# Patient Record
Sex: FEMALE | Race: WHITE | Employment: FULL TIME | URBAN - METROPOLITAN AREA
[De-identification: names, ages, dates, MRNs, and addresses within clinical notes are randomized per-mention and may not be internally consistent; named-entity substitution may affect disease eponyms.]

---

## 2017-09-14 NOTE — TELEPHONE ENCOUNTER
Requested Prescriptions     Pending Prescriptions Disp Refills    ERICK CARLOS RX 2.2-25-1 mg tab [Pharmacy Med Name: TL CARLOS RX TABLETS] 90 Tab 0     Sig: TAKE 1 TABLET BY MOUTH DAILY       Last Refill: 4/3/17  Last visit:10/20/16

## 2017-09-29 ENCOUNTER — APPOINTMENT (OUTPATIENT)
Dept: MAMMOGRAPHY | Age: 42
End: 2017-09-29

## 2017-09-29 ENCOUNTER — OFFICE VISIT (OUTPATIENT)
Dept: OBGYN CLINIC | Age: 42
End: 2017-09-29

## 2017-09-29 VITALS
HEIGHT: 66 IN | SYSTOLIC BLOOD PRESSURE: 102 MMHG | WEIGHT: 180 LBS | BODY MASS INDEX: 28.93 KG/M2 | DIASTOLIC BLOOD PRESSURE: 60 MMHG

## 2017-09-29 DIAGNOSIS — Z01.411 ENCOUNTER FOR GYNECOLOGICAL EXAMINATION (GENERAL) (ROUTINE) WITH ABNORMAL FINDINGS: Primary | ICD-10-CM

## 2017-09-29 DIAGNOSIS — Z12.31 ENCOUNTER FOR SCREENING MAMMOGRAM FOR MALIGNANT NEOPLASM OF BREAST: ICD-10-CM

## 2017-09-29 DIAGNOSIS — N92.4 EXCESSIVE BLEEDING IN PREMENOPAUSAL PERIOD: ICD-10-CM

## 2017-09-29 NOTE — MR AVS SNAPSHOT
Visit Information Date & Time Provider Department Dept. Phone Encounter #  
 9/29/2017  8:20 AM Yolanda Charlton MD [facility] [phone] [id number] Upcoming Health Maintenance Date Due INFLUENZA AGE 9 TO ADULT 8/1/2017 PAP AKA CERVICAL CYTOLOGY 9/23/2020 Allergies as of 9/29/2017  Review Complete On: 9/29/2017 By: Aqiules Sparks LPN Severity Noted Reaction Type Reactions Aamir  08/19/2014    Hives Penicillins  08/18/2014    Unknown (comments) Current Immunizations  Never Reviewed No immunizations on file. Not reviewed this visit Vitals BP Height(growth percentile) Weight(growth percentile) LMP BMI OB Status 102/60 5' 5.5\" (1.664 m) 180 lb (81.6 kg) 09/10/2017 29.5 kg/m2 Having regular periods Smoking Status Never Smoker BMI and BSA Data Body Mass Index Body Surface Area  
 29.5 kg/m 2 1.94 m 2 Preferred Pharmacy Pharmacy Name Phone [facility] [address]  [facility] [phone] Your Updated Medication List  
  
   
This list is accurate as of: 9/29/17  8:40 AM.  Always use your most recent med list.  
  
  
  
  
 aspirin delayed-release 81 mg tablet Take  by mouth daily. CALCIUM 500 PO Take  by mouth. FISH OIL PO Take  by mouth. * FOLGARD 0.8- mg-mg-mcg Tab Generic drug:  folic acid-vit Y4-VPJ D65 Take 1 Tab by mouth daily. * TL CARLOS RX 2.2-25-1 mg Tab Generic drug:  folic acid-vit N3-XKG L55 TAKE 1 TABLET BY MOUTH DAILY  
  
 multivitamin tablet Commonly known as:  ONE A DAY Take 1 Tab by mouth daily. VITAMIN E PO Take  by mouth. ZOLOFT PO Take  by mouth. * Notice: This list has 2 medication(s) that are the same as other medications prescribed for you.  Read the directions carefully, and ask your doctor or other care provider to review them with you. Patient Instructions Well Visit, Ages 25 to 48: Care Instructions Your Care Instructions Physical exams can help you stay healthy. Your doctor has checked your overall health and may have suggested ways to take good care of yourself. He or she also may have recommended tests. At home, you can help prevent illness with healthy eating, regular exercise, and other steps. Follow-up care is a key part of your treatment and safety. Be sure to make and go to all appointments, and call your doctor if you are having problems. It's also a good idea to know your test results and keep a list of the medicines you take. How can you care for yourself at home? · Reach and stay at a healthy weight. This will lower your risk for many problems, such as obesity, diabetes, heart disease, and high blood pressure. · Get at least 30 minutes of physical activity on most days of the week. Walking is a good choice. You also may want to do other activities, such as running, swimming, cycling, or playing tennis or team sports. Discuss any changes in your exercise program with your doctor. · Do not smoke or allow others to smoke around you. If you need help quitting, talk to your doctor about stop-smoking programs and medicines. These can increase your chances of quitting for good. · Talk to your doctor about whether you have any risk factors for sexually transmitted infections (STIs). Having one sex partner (who does not have STIs and does not have sex with anyone else) is a good way to avoid these infections. · Use birth control if you do not want to have children at this time. Talk with your doctor about the choices available and what might be best for you. · Protect your skin from too much sun.  When you're outdoors from 10 a.m. to 4 p.m., stay in the shade or cover up with clothing and a hat with a wide brim. Wear sunglasses that block UV rays. Even when it's cloudy, put broad-spectrum sunscreen (SPF 30 or higher) on any exposed skin. · See a dentist one or two times a year for checkups and to have your teeth cleaned. · Wear a seat belt in the car. · Drink alcohol in moderation, if at all. That means no more than 2 drinks a day for men and 1 drink a day for women. Follow your doctor's advice about when to have certain tests. These tests can spot problems early. For everyone · Cholesterol. Have the fat (cholesterol) in your blood tested after age 21. Your doctor will tell you how often to have this done based on your age, family history, or other things that can increase your risk for heart disease. · Blood pressure. Have your blood pressure checked during a routine doctor visit. Your doctor will tell you how often to check your blood pressure based on your age, your blood pressure results, and other factors. · Vision. Talk with your doctor about how often to have a glaucoma test. 
· Diabetes. Ask your doctor whether you should have tests for diabetes. · Colon cancer. Have a test for colon cancer at age 48. You may have one of several tests. If you are younger than 48, you may need a test earlier if you have any risk factors. Risk factors include whether you already had a precancerous polyp removed from your colon or whether your parent, brother, sister, or child has had colon cancer. For women · Breast exam and mammogram. Talk to your doctor about when you should have a clinical breast exam and a mammogram. Medical experts differ on whether and how often women under 50 should have these tests. Your doctor can help you decide what is right for you. · Pap test and pelvic exam. Begin Pap tests at age 24. A Pap test is the best way to find cervical cancer.  The test often is part of a pelvic exam. Ask how often to have this test. 
 · Tests for sexually transmitted infections (STIs). Ask whether you should have tests for STIs. You may be at risk if you have sex with more than one person, especially if your partners do not wear condoms. For men · Tests for sexually transmitted infections (STIs). Ask whether you should have tests for STIs. You may be at risk if you have sex with more than one person, especially if you do not wear a condom. · Testicular cancer exam. Ask your doctor whether you should check your testicles regularly. · Prostate exam. Talk to your doctor about whether you should have a blood test (called a PSA test) for prostate cancer. Experts differ on whether and when men should have this test. Some experts suggest it if you are older than 39 and are -American or have a father or brother who got prostate cancer when he was younger than 72. When should you call for help? Watch closely for changes in your health, and be sure to contact your doctor if you have any problems or symptoms that concern you. Where can you learn more? Go to http://mariusz-sim.info/. Enter P072 in the search box to learn more about \"Well Visit, Ages 25 to 48: Care Instructions. \" Current as of: July 19, 2016 Content Version: 11.3 © 8483-4304 [employer], [employer]. Care instructions adapted under license by [employer] (which disclaims liability or warranty for this information). If you have questions about a medical condition or this instruction, always ask your healthcare professional. [place] [address] any warranty or liability for your use of this information. Introducing [facility]! Emma Andersen introduces [facility] patient portal. Now you can access parts of your medical record, email your doctor's office, and request medication refills online. 1. In your internet browser, go to [email or web address] 2. Click on the First Time User? Click Here link in the Sign In box. You will see the New Member Sign Up page. 3. Enter your iPAYst Access Code exactly as it appears below. You will not need to use this code after youve completed the sign-up process. If you do not sign up before the expiration date, you must request a new code. · iPAY[employer] Access Code: [email or web address] Expires: 12/28/2017  8:40 AM 
 
4. Enter the last four digits of your Social Security Number (xxxx) and Date of Birth (mm/dd/yyyy) as indicated and click Submit. You will be taken to the next sign-up page. 5. Create a iPAYst ID. This will be your iPAYst login ID and cannot be changed, so think of one that is secure and easy to remember. 6. Create a i[employer]st password. You can change your password at any time. 7. Enter your Password Reset Question and Answer. This can be used at a later time if you forget your password. 8. Enter your e-mail address. You will receive e-mail notification when new information is available in [address] 9. Click Sign Up. You can now view and download portions of your medical record. 10. Click the Download Summary menu link to download a portable copy of your medical information. If you have questions, please visit the Frequently Asked Questions section of the i[employer]st website. Remember, iPAYst is NOT to be used for urgent needs. For medical emergencies, dial 911. Now available from your iPhone and Android! Please provide this summary of care documentation to your next provider. If you have any questions after today's visit, please call [phone].

## 2017-09-29 NOTE — PROGRESS NOTES
[facility]  [email or web address]  [phone]    Whit Monterroso MD, [address] [facility]       Annual Gynecologic Exam  WWE 40-60  Chief Complaint   Patient presents with    Well Woman    Other     heavy menses       Josefa Bello is a 42 y.o.  [facility]  female who presents for an annual exam.  Patient's last menstrual period was 09/10/2017. Anup Blake Heavy especially day 1, even with 600mg ibuprofen. Soils clothes and interrupts work/meetings. Sometimes has to work from home it is so heavy. She does not report additional concerns today. Menstrual status:  Her periods are heavy. She does not report dysmenorrhea/painful menses. She does not report irregular bleeding. Sexual history and Contraception:  History   Sexual Activity    Sexual activity: Yes    Partners: Male    Birth control/ protection: Surgical     Comment: Vasectomy     She never uses condoms with sexual activity. She does not reports new sexual partner(s) in the last year. The patient does not request STD testing. Preventive Medicine History:  Her last annual GYN exam was about two years ago. Her most recent Pap smear result: normal was obtained in 2015. Her most recent HR HPV screen was Negative obtained 2 year(s) ago. She does not have a history of DEDE 2, 3 or cervical cancer. Breast health:  Last mammogram: approximate date 9/23/15 and was normal.   A mammogram was scheduled for today. Breast cancer family updated: see . Bone health: Anup Blake She does not have a history of osteopenia/osteoporosis. Osteoporosis family history updated: see .      Past Medical History:   Diagnosis Date    Abnormal Pap smear     HPV and colpo wnl fu    Anxiety     Fibrocystic breast     History of mammogram 9/23/15    Negative    Lipoma     forehead and shoulder removed in     MTHFR mutation ([facility] [address]     Pap smear for cervical cancer screening 3/24/10; 9/23/15    neg hpv neg; Negative, HPV negative     OB History    Para Term  AB Living   4 2 2 0 2 2   SAB TAB Ectopic Molar Multiple Live Births   2 0 0  0       # Outcome Date GA Lbr Ruiz/2nd Weight Sex Delivery Anes PTL Lv   4 SAB            3 SAB            2 Term            1 Term                   Past Surgical History:   Procedure Laterality Date    HX BREAST LUMPECTOMY      HX COLPOSCOPY      HX OTHER SURGICAL      facial surgery    HX SHOULDER ARTHROSCOPY      lump removed- lipoma    HX TONSILLECTOMY       Family History   Problem Relation Age of Onset    Hypertension Father     High Cholesterol Father     Stroke Paternal Grandmother      Social History     Social History    Marital status:      Spouse name: N/A    Number of children: N/A    Years of education: N/A     Occupational History    Not on file. Social History Main Topics    Smoking status: Never Smoker    Smokeless tobacco: Not on file    Alcohol use No    Drug use: Not on file    Sexual activity: Yes     Partners: Male     Birth control/ protection: Surgical      Comment: Vasectomy     Other Topics Concern    Not on file     Social History Narrative       Allergies   Allergen Reactions    Aamir Hives    Penicillins Unknown (comments)       Current Outpatient Prescriptions   Medication Sig    multivitamin (ONE A DAY) tablet Take 1 Tab by mouth daily.  VITAMIN E ACETATE (VITAMIN E PO) Take  by mouth.  aspirin delayed-release 81 mg tablet Take  by mouth daily.  CALCIUM CARBONATE (CALCIUM 500 PO) Take  by mouth.  DOCOSAHEXANOIC ACID/EPA (FISH OIL PO) Take  by mouth.  folic acid-vit O8-LCZ Q73 (FOLGARD) 0.8- mg-mg-mcg tab Take 1 Tab by mouth daily.  SERTRALINE HCL (ZOLOFT PO) Take  by mouth.  TL CARLOS RX 2.2-25-1 mg tab TAKE 1 TABLET BY MOUTH DAILY     No current facility-administered medications for this visit. There is no problem list on file for this patient.       Review of Systems - History obtained from the patient  Constitutional: negative for weight loss, fever, night sweats  HEENT: negative for hearing loss, earache, congestion, snoring, sorethroat  CV: negative for chest pain, palpitations, edema  Resp: negative for cough, shortness of breath, wheezing  GI: negative for change in bowel habits, abdominal pain, black or bloody stools  : negative for frequency, dysuria, hematuria, vaginal discharge  MSK: negative for back pain, joint pain, muscle pain  Breast: negative for breast lumps, nipple discharge, galactorrhea  Skin :negative for itching, rash, hives  Neuro: negative for dizziness, headache, confusion, weakness  Psych: negative for anxiety, depression, change in mood  Heme/lymph: negative for bleeding, bruising, pallor    Physical Exam  Visit Vitals    /60    Ht 5' 5.5\" (1.664 m)    Wt 180 lb (81.6 kg)    LMP 09/10/2017    BMI 29.5 kg/m2       Constitutional  · Appearance: well-nourished, well developed, alert, in no acute distress    HENT  · Head and Face: appears normal    Neck  · Inspection/Palpation: normal appearance, no masses or tenderness  · Lymph Nodes: no lymphadenopathy present  · Thyroid: gland size normal, nontender, no nodules or masses present on palpation    Chest  · Respiratory Effort: breathing unlabored  · Auscultation: normal breath sounds    Cardiovascular  · Heart:  · Auscultation: regular rate and rhythm without murmur    Breasts  · Inspection of Breasts: breasts symmetrical, no skin changes, no discharge present, nipple appearance normal, no skin retraction present  · Palpation of Breasts and Axillae: no masses present on palpation, no breast tenderness  · Axillary Lymph Nodes: no lymphadenopathy present    Gastrointestinal  · Abdominal Examination: abdomen non-tender to palpation, normal bowel sounds, no masses present  · Liver and spleen: no hepatomegaly present, spleen not palpable  · Hernias: no hernias identified    Genitourinary  · External Genitalia: normal appearance for age, no discharge present, no tenderness present, no inflammatory lesions present, no masses present, no atrophy present  · Vagina: normal vaginal vault without central or paravaginal defects, no discharge present, no inflammatory lesions present, no masses present  · Bladder: non-tender to palpation  · Urethra: appears normal  · Cervix: normal   · Uterus: normal size, shape and consistency  · Adnexa: no adnexal tenderness present, no adnexal masses present  · Perineum: perineum within normal limits, no evidence of trauma, no rashes or skin lesions present  · Anus: anus within normal limits, no hemorrhoids present  · Inguinal Lymph Nodes: no lymphadenopathy present    Skin  · General Inspection: no rash, no lesions identified    Neurologic/Psychiatric  · Mental Status:  · Orientation: grossly oriented to person, place and time  · Mood and Affect: mood normal, affect appropriate    Assessment:  42 y.o.  for well woman exam  Encounter Diagnoses   Name Primary?  Encounter for gynecological examination (general) (routine) with abnormal findings Yes    Excessive bleeding in premenopausal period        Plan:  The patient was counseled about diet, exercise, healthy lifestyle  We discussed current self breast exam and mammogram recommendations  We discussed current pap smear and HR HPV testing guidelines  We recommend follow up in one year for routine annual gynecologic exam or sooner if needed  We recommend follow up with a primary care physician for any chronic medical problems or non-gynecologic concerns    We discussed calcium/vitamin D/weight bearing exercise and osteoporosis prevention  Handouts were given to the patient     We discussed potential causes of symptomatic bleeding: including but not limited to hormonal, medical, infection/inflammation and structural etiologies.   We discussed options for managing symptoms including but not limited to observation, NSAIDS, hormonal management, IUDs, ablation, and hysterectomy. mirena h/o  RTC for IUD or LARC placement on menses: after patient confirms coverage with insurance and has no unprotected intercourse for two weeks. Patient advised to premedicate with 600 mg ibuprofen if she has no contraindications. Her AE and pap should also be up to date, if indicated. Disc rba of iud vs ablation, questions answered. Folllow up:  [x] return for annual well woman exam in one year or sooner if she is having problems  [] follow up and ultrasound  [x] mammogram  [] 6 months  [] 3 months  [] 1 month    Orders Placed This Encounter    CBC W/O DIFF       Results for orders placed or performed in visit on 09/29/17   [facility] MAMMO BI SCREENING INCL CAD    Narrative    STUDY: Bilateral digital screening mammogram    INDICATION:  Screening. COMPARISON:  2015, 2011    BREAST COMPOSITION:  The breasts are heterogeneously dense, which may obscure  small masses. FINDINGS: Bilateral digital screening mammography was performed and is  interpreted in conjunction with a computer assisted detection (CAD) system. No  suspicious masses or calcifications are identified. There has been no  significant change. Impression    IMPRESSION:  BI-RADS 1: Negative. No mammographic evidence of malignancy. RECOMMENDATIONS:  Next screening mammogram is recommended in one year. Consider krystal synthesis  (3-D) mammography for future screening. The patient will be notified of these results.

## 2017-09-29 NOTE — PATIENT INSTRUCTIONS

## 2017-09-30 LAB
ERYTHROCYTE [DISTWIDTH] IN BLOOD BY AUTOMATED COUNT: 13.8 % (ref 12.3–15.4)
HCT VFR BLD AUTO: 42.2 % (ref 34–46.6)
HGB BLD-MCNC: 13.8 G/DL (ref 11.1–15.9)
MCH RBC QN AUTO: 29.6 PG (ref 26.6–33)
MCHC RBC AUTO-ENTMCNC: 32.7 G/DL (ref 31.5–35.7)
MCV RBC AUTO: 91 FL (ref 79–97)
PLATELET # BLD AUTO: 375 X10E3/UL (ref 150–379)
RBC # BLD AUTO: 4.66 X10E6/UL (ref 3.77–5.28)
WBC # BLD AUTO: 9.7 X10E3/UL (ref 3.4–10.8)

## 2017-10-04 ENCOUNTER — TELEPHONE (OUTPATIENT)
Dept: OBGYN CLINIC | Age: 42
End: 2017-10-04

## 2017-10-04 NOTE — TELEPHONE ENCOUNTER
Patient notified of results and MD recommendations. Patient verbalized understanding.     ----- Message from Dominique Mas MD sent at 9/30/2017  9:16 PM EDT -----  The results are normal.   Please notify patient. Recommend f/u if still having symptoms/problems or has additional concerns.

## 2017-12-11 RX ORDER — SERTRALINE HYDROCHLORIDE 100 MG/1
TABLET, FILM COATED ORAL
Qty: 90 TAB | Refills: 0 | Status: SHIPPED | OUTPATIENT
Start: 2017-12-11 | End: 2018-03-30 | Stop reason: SDUPTHER

## 2017-12-11 NOTE — TELEPHONE ENCOUNTER
Requested Prescriptions     Pending Prescriptions Disp Refills    sertraline (ZOLOFT) 100 mg tablet [Pharmacy Med Name: SERTRALINE 100MG TABLETS] 90 Tab 0     Sig: TAKE 1 TABLET BY MOUTH DAILY       Last Refill: 10-20-16  Last visit:10-20-16

## 2017-12-13 PROBLEM — N60.19 FIBROCYSTIC BREAST: Status: ACTIVE | Noted: 2017-12-13

## 2017-12-13 PROBLEM — F41.8 ANXIETY ASSOCIATED WITH DEPRESSION: Status: ACTIVE | Noted: 2017-12-13

## 2017-12-13 PROBLEM — D68.59 PROTEIN C DEFICIENCY (HCC): Status: ACTIVE | Noted: 2017-12-13

## 2017-12-13 PROBLEM — B00.1 RECURRENT COLD SORES: Status: ACTIVE | Noted: 2017-12-13

## 2017-12-20 ENCOUNTER — OFFICE VISIT (OUTPATIENT)
Dept: INTERNAL MEDICINE CLINIC | Age: 42
End: 2017-12-20

## 2017-12-20 VITALS
TEMPERATURE: 97.9 F | HEIGHT: 66 IN | OXYGEN SATURATION: 99 % | DIASTOLIC BLOOD PRESSURE: 78 MMHG | HEART RATE: 61 BPM | SYSTOLIC BLOOD PRESSURE: 102 MMHG | BODY MASS INDEX: 29.6 KG/M2 | RESPIRATION RATE: 16 BRPM | WEIGHT: 184.2 LBS

## 2017-12-20 DIAGNOSIS — F41.8 ANXIETY ASSOCIATED WITH DEPRESSION: Primary | ICD-10-CM

## 2017-12-20 DIAGNOSIS — D68.59 PROTEIN C DEFICIENCY (HCC): ICD-10-CM

## 2017-12-20 DIAGNOSIS — B00.1 RECURRENT COLD SORES: ICD-10-CM

## 2017-12-20 DIAGNOSIS — Z15.89 MTHFR MUTATION: ICD-10-CM

## 2017-12-20 LAB
ALBUMIN SERPL-MCNC: 4.8 G/DL (ref 3.9–5.4)
ALKALINE PHOS POC: 100 U/L (ref 38–126)
ALT SERPL-CCNC: 19 U/L (ref 9–52)
AST SERPL-CCNC: 24 U/L (ref 14–36)
BUN BLD-MCNC: 18 MG/DL (ref 7–17)
CALCIUM BLD-MCNC: 10.2 MG/DL (ref 8.4–10.2)
CHLORIDE BLD-SCNC: 103 MMOL/L (ref 98–107)
CHOLEST SERPL-MCNC: 230 MG/DL (ref 0–200)
CO2 POC: 26 MMOL/L (ref 22–32)
CREAT BLD-MCNC: 0.7 MG/DL (ref 0.7–1.2)
EGFR (POC): 106.1
GLUCOSE POC: 87 MG/DL (ref 65–105)
GRAN# POC: 7.7 K/UL (ref 2–7.8)
GRAN% POC: 69.8 % (ref 37–92)
HCT VFR BLD CALC: 41.6 % (ref 37–51)
HDLC SERPL-MCNC: 84 MG/DL (ref 35–130)
HGB BLD-MCNC: 14 G/DL (ref 12–18)
LDL CHOLESTEROL POC: 120.6 MG/DL (ref 0–130)
LY# POC: 2.9 K/UL (ref 0.6–4.1)
LY% POC: 27 % (ref 10–58.5)
MCH RBC QN: 30.7 PG (ref 26–32)
MCHC RBC-ENTMCNC: 33.6 G/DL (ref 30–36)
MCV RBC: 91 FL (ref 80–97)
MID #, POC: 0.3 K/UL (ref 0–1.8)
MID% POC: 3.2 % (ref 0.1–24)
PLATELET # BLD: 349 K/UL (ref 140–440)
POTASSIUM SERPL-SCNC: 4.2 MMOL/L (ref 3.6–5)
PROT SERPL-MCNC: 7.8 G/DL (ref 6.3–8.2)
RBC # BLD: 4.55 M/UL (ref 4.2–6.3)
SODIUM SERPL-SCNC: 143 MMOL/L (ref 137–145)
TCHOL/HDL RATIO (POC): 2.7 (ref 0–4)
TOTAL BILIRUBIN POC: 0.8 MG/DL (ref 0.2–1.3)
TRIGL SERPL-MCNC: 127 MG/DL (ref 0–200)
TSH BLD-ACNC: 1.99 UIU/ML (ref 0.4–4.2)
VLDLC SERPL CALC-MCNC: 25.4 MG/DL
WBC # BLD: 10.9 K/UL (ref 4.1–10.9)

## 2017-12-20 NOTE — PROGRESS NOTES
This note will not be viewable in [address] Yulissa Hill is a 36 y.o. female and presents with Anxiety (1 yr f/u)  . Subjective:  Mrs. Jordana Andersen returns for office today in follow-up of multiple medical problems. The patient is treated for anxiety associated with depression. She is on sertraline. This works well for her. Her only side effect has been weight gain over time but she is trying to exercise and lose weight actively. She the medication has never given her any tolerance or breakthrough symptoms. The patient has recurrent cold sores and does take acyclovir periodically for reoccurrences. She has noted market improvement with the use of the Zovirax ointment. The patient has protein C deficiency. The patient has had no blood clots to date. She is not on any type of hormonal therapy. The patient also has the [facility] FR mutation and remains on full guard for this. She has not had her cholesterol level done in some time and has no history of vascular disease. The patient is up-to-date on Pap testing and mammography.     Past Medical History:   Diagnosis Date    Abnormal Pap smear 1993    HPV and colpo wnl fu    Anxiety     Anxiety associated with depression 12/13/2017    Fibrocystic breast     History of mammogram 9/23/15; 9/29/17    Negative; Negative    Lipoma     forehead and shoulder removed in 2008    MTHFR mutation ([facility] [address]     MTHFR mutation ([facility] [address] 12/20/2017    Pap smear for cervical cancer screening 3/24/10; 9/23/15    neg hpv neg; Negative, HPV negative    Protein C deficiency ([facility] [address] 12/13/2017    And a prothrombin gene mutation    Recurrent cold sores 12/13/2017     Past Surgical History:   Procedure Laterality Date    HX BREAST LUMPECTOMY  1995    HX COLPOSCOPY  1993    HX OTHER SURGICAL      facial surgery    HX SHOULDER ARTHROSCOPY  2008    lump removed- lipoma    HX TONSILLECTOMY       Allergies   Allergen Reactions    Aamir Hives    Penicillins Unknown (comments)     Current Outpatient Prescriptions   Medication Sig Dispense Refill    acyclovir (ZOVIRAX) 400 mg tablet Take 400 mg by mouth every four (4) hours (while awake).  acyclovir (ZOVIRAX) 5 % ointment Apply  to affected area every three (3) hours.  sertraline (ZOLOFT) 100 mg tablet TAKE 1 TABLET BY MOUTH DAILY 90 Tab 0    multivitamin (ONE A DAY) tablet Take 1 Tab by mouth daily.  VITAMIN E ACETATE (VITAMIN E PO) Take  by mouth.  aspirin delayed-release 81 mg tablet Take  by mouth daily.  CALCIUM CARBONATE (CALCIUM 500 PO) Take  by mouth.  DOCOSAHEXANOIC ACID/EPA (FISH OIL PO) Take  by mouth.  folic acid-vit P3-JSH G60 (FOLGARD) 0.8- mg-mg-mcg tab Take 1 Tab by mouth daily.        Social History     Social History    Marital status:      Spouse name: N/A    Number of children: N/A    Years of education: N/A     Social History Main Topics    Smoking status: Never Smoker    Smokeless tobacco: Never Used    Alcohol use No      Comment: socially    Drug use: No    Sexual activity: Yes     Partners: Male     Birth control/ protection: Surgical      Comment: Vasectomy     Other Topics Concern    None     Social History Narrative     Family History   Problem Relation Age of Onset    Hypertension Father     High Cholesterol Father     Stroke Paternal Grandmother        Health Maintenance   Topic Date Due    DTaP/Tdap/Td series (1 - Tdap) 10/25/1995    PAP AKA CERVICAL CYTOLOGY  09/23/2020    Influenza Age 5 to Adult  Addressed        Review of Systems  Constitutional: negative for fevers, chills, anorexia and weight loss  Eyes:   negative for visual disturbance and irritation  ENT:   negative for tinnitus,sore throat,nasal congestion,ear pain,hoarseness  Respiratory:  negative for cough, hemoptysis, dyspnea,wheezing  CV:   negative for chest pain, palpitations, lower extremity edema  GI:   negative for nausea, vomiting, diarrhea, abdominal pain,melena  Endo:               negative for polyuria,polydipsia,polyphagia,heat intolerance  Genitourinary: negative for frequency, dysuria and hematuria  Integumentary: negative for rash and pruritus  Hematologic:  negative for easy bruising and gum/nose bleeding  Musculoskel: negative for myalgias, arthralgias, back pain, muscle weakness, joint pain  Neurological:  negative for headaches, dizziness, vertigo, memory problems and gait   Behavl/Psych: negative for feelings of anxiety, depression, mood changes  ROS otherwise negative      Objective:  Visit Vitals    /78 (BP 1 Location: Right arm, BP Patient Position: Sitting)    Pulse 61    Temp 97.9 °F (36.6 °C) (Oral)    Resp 16    Ht 5' 5.5\" (1.664 m)    Wt 184 lb 3.2 oz (83.6 kg)    LMP 11/30/2017 (Approximate)    SpO2 99%    BMI 30.19 kg/m2     Body mass index is 30.19 kg/(m^2). Physical Exam:   General appearance - alert, well appearing, and in no distress  Mental status - alert, oriented to person, place, and time  EYE-ARACELI, EOMI,conjunctiva normal bilaterally, lids normal  ENT-ENT exam normal, no neck nodes or sinus tenderness  Nose - normal and patent, no erythema,  Or discharge   Mouth - mucous membranes moist, pharynx normal without lesions  Neck - supple, no significant adenopathy or bruit  Chest - clear to auscultation, no wheezes, rales or rhonchi. Heart - normal rate, regular rhythm, normal S1, S2, no murmurs, rubs, clicks or gallops   Abdomen - soft, nontender, nondistended, no masses or organomegaly  Lymph- no adenopathy palpable  Ext-peripheral pulses normal, no pedal edema, no clubbing or cyanosis  Skin-Warm and dry.  no hyperpigmentation, vitiligo, or suspicious lesions  Neuro -alert, oriented, normal speech, no focal findings or movement disorder noted      Assessment/Plan:  Diagnoses and all orders for this visit:    Anxiety associated with depression  -     AMB POC COMPLETE CBC,AUTOMATED ENTER  -     AMB POC COMPREHENSIVE METABOLIC PANEL  -     RI COLLECTION VENOUS BLOOD,VENIPUNCTURE  -     AMB POC TSH    Recurrent cold sores    MTHFR mutation (HCC)  -     AMB POC LIPID PROFILE    Protein C deficiency ([facility] [address]        Other instructions: The patient's medications are reviewed and reconciled. No change in her current medical regimen is made. A low-carb diet and exercise regimen have been discussed with the patient for weight loss. Await results of labs as drawn today    Follow-up yearly    Follow-up Disposition:  Return in about 1 year (around 12/20/2018). I have reviewed with the patient details of the assessment and plan and all questions were answered. Relevent patient education was performed. The most recent lab findings were reviewed with the patient. An After Visit Summary was printed and given to the patient.     Raffi Shah MD

## 2017-12-20 NOTE — PROGRESS NOTES
Chief Complaint   Patient presents with    Anxiety     1 yr f/u     1. Have you been to the ER, urgent care clinic since your last visit? Hospitalized since your last visit? No    2. Have you seen or consulted any other health care providers outside of the [address] [place] [address] since your last visit? Include any pap smears or colon screening.  No

## 2017-12-20 NOTE — MR AVS SNAPSHOT
Visit Information Date & Time Provider Department Dept. Phone Encounter #  
 12/20/2017  3:00 PM [facility] [phone] [id number] Follow-up Instructions Return in about 1 year (around 12/20/2018). Follow-up and Disposition History Your Appointments 10/1/2018  9:20 AM  
MAMMOGRAPHY with MAMMOGRAM, JERRY Amador Patrick [facility] Appt Note: mammo and ae TP  
 [address]  
  
    
 10/1/2018  9:40 AM  
ESTABLISHED PATIENT with MD Amador Ramsey ([facility] Appt Note: mammo and ae TP  
 [address] [facility] [id number]  
[facility] [address] Upcoming Health Maintenance Date Due DTaP/Tdap/Td series (1 - Tdap) 10/25/1995 PAP AKA CERVICAL CYTOLOGY 9/23/2020 Allergies as of 12/20/2017  Review Complete On: 12/20/2017 By: Deanna Sethi MD  
  
 Severity Noted Reaction Type Reactions [place]  08/19/2014    Hives Penicillins  08/18/2014    Unknown (comments) Current Immunizations  Never Reviewed No immunizations on file. Not reviewed this visit You Were Diagnosed With   
  
 Codes Comments Anxiety associated with depression    -  Primary ICD-10-CM: F41.8 ICD-9-CM: 300.4 Recurrent cold sores     ICD-10-CM: B00.1 ICD-9-CM: 054.9 MTHFR mutation ([facility] [address]     ICD-10-CM: E72.12 
ICD-9-CM: 270.4 Protein C deficiency ([facility] [address]     ICD-10-CM: F27.36 
ICD-9-CM: 289.81 Vitals BP Pulse Temp Resp Height(growth percentile) Weight(growth percentile) 102/78 (BP 1 Location: Right arm, BP Patient Position: Sitting) 61 97.9 °F (36.6 °C) (Oral) 16 5' 5.5\" (1.664 m) 184 lb 3.2 oz (83.6 kg) LMP SpO2 BMI OB Status Smoking Status 11/30/2017 (Approximate) 99% 30.19 kg/m2 Having regular periods Never Smoker BMI and BSA Data Body Mass Index Body Surface Area  
 30.19 kg/m 2 1.97 m 2 Preferred Pharmacy Pharmacy Name Phone [facility] [address]  [facility] [phone] Your Updated Medication List  
  
   
This list is accurate as of: 12/20/17  3:58 PM.  Always use your most recent med list.  
  
  
  
  
 * acyclovir 400 mg tablet Commonly known as:  ZOVIRAX Take 400 mg by mouth every four (4) hours (while awake). * acyclovir 5 % ointment Commonly known as:  ZOVIRAX Apply  to affected area every three (3) hours. aspirin delayed-release 81 mg tablet Take  by mouth daily. CALCIUM 500 PO Take  by mouth. FISH OIL PO Take  by mouth. FOLGARD 0.8- mg-mg-mcg Tab Generic drug:  folic acid-vit E4-OFB S57 Take 1 Tab by mouth daily. multivitamin tablet Commonly known as:  ONE A DAY Take 1 Tab by mouth daily. sertraline 100 mg tablet Commonly known as:  ZOLOFT  
TAKE 1 TABLET BY MOUTH DAILY  
  
 VITAMIN E PO Take  by mouth. * Notice: This list has 2 medication(s) that are the same as other medications prescribed for you. Read the directions carefully, and ask your doctor or other care provider to review them with you. We Performed the Following AMB POC COMPLETE CBC,AUTOMATED ENTER Q079425 CPT(R)] AMB POC COMPREHENSIVE METABOLIC PANEL [43536 CPT(R)] AMB POC LIPID PROFILE [17752 CPT(R)] AMB POC TSH [44143 CPT(R)] SD COLLECTION VENOUS BLOOD,VENIPUNCTURE [id number] CPT(R)] Follow-up Instructions Return in about 1 year (around 12/20/2018). Patient Instructions Cold Sores: Care Instructions Your Care Instructions Cold sores are clusters of small blisters on the lip and skin around or inside the mouth. Often the first sign of a cold sore is a spot that tingles, burns, or itches. A blister usually forms within 24 hours. The skin around the blisters can be red and inflamed. The blisters can break open, weep a clear fluid, and then scab over after a few days. Cold sores most often heal in 7 to 10 days without a scar. They are sometimes called fever blisters. Cold sores are caused by a virus called the herpes simplex virus. This virus also causes some cases of genital herpes. The virus can spread from a sore in the genital area to the lips. Or it can spread from a cold sore on the lips to the genital area. Cold sores most often go away on their own. But if they are severe, embarrass you, or cause pain, your doctor may prescribe antiviral medicine to relieve pain and help prevent outbreaks. Follow-up care is a key part of your treatment and safety. Be sure to make and go to all appointments, and call your doctor if you are having problems. It's also a good idea to know your test results and keep a list of the medicines you take. How can you care for yourself at home? · Wash your hands often. And try not to touch your cold sores. This will help to avoid spreading the virus to your eyes or genital area, or to other people. This is more likely to happen if this is your first cold sore outbreak. · Place ice or a cool, wet towel on the sores 3 times a day. Do this for 20 minutes each time. It may help to reduce redness and swelling. · If you are just getting a cold sore, try over-the-counter docosanol (Abreva) cream to reduce symptoms. · If your doctor prescribed antiviral medicine to relieve pain and help prevent outbreaks, be sure to follow the directions. · Take an over-the-counter pain medicine, such as acetaminophen (Tylenol), ibuprofen (Advil, Motrin), or naproxen (Aleve), as needed.  Read and follow all instructions on the label. · Do not take two or more pain medicines at the same time unless the doctor told you to. Many pain medicines have acetaminophen, which is Tylenol. Too much acetaminophen (Tylenol) can be harmful. · Avoid citrus fruit, tomatoes, and other foods that contain acid. · Use over-the-counter ointments to numb sore areas in the mouth or on the lips. These include Orajel and Anbesol. · Do not kiss or have oral sex with anyone while you have a cold sore. To prevent cold sores in the future · Avoid long exposure of your lips to the sun. (Wear a hat to help shade your mouth.) · Do not kiss or have oral sex with someone who has a cold sore. Do not have sex or oral sex with someone who has a genital herpes outbreak. · Using lip balm that contains sunscreen may help reduce outbreaks of cold sores. · Avoid foods that seem to cause your cold sores to come back. · Do not share towels, razors, silverware, toothbrushes, or other objects with a person who has a cold sore. When should you call for help? Call your doctor now or seek immediate medical care if: 
? · Your symptoms are painful and you want to try antiviral medicine. ? · You have signs of infection, such as: 
¨ Increased pain, swelling, warmth, or redness. ¨ Red streaks leading from a cold sore. ¨ Pus draining from a cold sore. ¨ A fever. ? · You have a cold sore and develop eye pain, eye discharge, or any changes in your vision. ? Watch closely for changes in your health, and be sure to contact your doctor if: 
? · The cold sore does not heal in 7 to 10 days. ? · You get cold sores often. Where can you learn more? Go to http://mariusz-sim.info/. Enter A995 in the search box to learn more about \"Cold Sores: Care Instructions. \" Current as of: March 20, 2017 Content Version: 11.4 © 9007-1762 [employer].  Care instructions adapted under license by [address] (which disclaims liability or warranty for this information). If you have questions about a medical condition or this instruction, always ask your healthcare professional. [address] any warranty or liability for your use of this information. Patient Instructions History Introducing [facility]! [facility] introduces GigaSpaces patient portal. Now you can access parts of your medical record, email your doctor's office, and request medication refills online. 1. In your internet browser, go to [email or web address] 2. Click on the First Time User? Click Here link in the Sign In box. You will see the New Member Sign Up page. 3. Enter your GigaSpaces Access Code exactly as it appears below. You will not need to use this code after youve completed the sign-up process. If you do not sign up before the expiration date, you must request a new code. · GigaSpaces Access Code: [email or web address] Expires: 12/28/2017  7:40 AM 
 
4. Enter the last four digits of your Social Security Number (xxxx) and Date of Birth (mm/dd/yyyy) as indicated and click Submit. You will be taken to the next sign-up page. 5. Create a GigaSpaces ID. This will be your GigaSpaces login ID and cannot be changed, so think of one that is secure and easy to remember. 6. Create a GigaSpaces password. You can change your password at any time. 7. Enter your Password Reset Question and Answer. This can be used at a later time if you forget your password. 8. Enter your e-mail address. You will receive e-mail notification when new information is available in [address] 9. Click Sign Up. You can now view and download portions of your medical record. 10. Click the Download Summary menu link to download a portable copy of your medical information.  
 
If you have questions, please visit the Frequently Asked Questions section of the [employer]. Remember, [employer] is NOT to be used for urgent needs. For medical emergencies, dial 911. Now available from your iPhone and Android! Please provide this summary of care documentation to your next provider. Your primary care clinician is listed as MADINA Chin. If you have any questions after today's visit, please call [phone].

## 2017-12-20 NOTE — PATIENT INSTRUCTIONS
Cold Sores: Care Instructions  Your Care Instructions  Cold sores are clusters of small blisters on the lip and skin around or inside the mouth. Often the first sign of a cold sore is a spot that tingles, burns, or itches. A blister usually forms within 24 hours. The skin around the blisters can be red and inflamed. The blisters can break open, weep a clear fluid, and then scab over after a few days. Cold sores most often heal in 7 to 10 days without a scar. They are sometimes called fever blisters. Cold sores are caused by a virus called the herpes simplex virus. This virus also causes some cases of genital herpes. The virus can spread from a sore in the genital area to the lips. Or it can spread from a cold sore on the lips to the genital area. Cold sores most often go away on their own. But if they are severe, embarrass you, or cause pain, your doctor may prescribe antiviral medicine to relieve pain and help prevent outbreaks. Follow-up care is a key part of your treatment and safety. Be sure to make and go to all appointments, and call your doctor if you are having problems. It's also a good idea to know your test results and keep a list of the medicines you take. How can you care for yourself at home? · Wash your hands often. And try not to touch your cold sores. This will help to avoid spreading the virus to your eyes or genital area, or to other people. This is more likely to happen if this is your first cold sore outbreak. · Place ice or a cool, wet towel on the sores 3 times a day. Do this for 20 minutes each time. It may help to reduce redness and swelling. · If you are just getting a cold sore, try over-the-counter docosanol (Abreva) cream to reduce symptoms. · If your doctor prescribed antiviral medicine to relieve pain and help prevent outbreaks, be sure to follow the directions.   · Take an over-the-counter pain medicine, such as acetaminophen (Tylenol), ibuprofen (Advil, Motrin), or naproxen (Aleve), as needed. Read and follow all instructions on the label. · Do not take two or more pain medicines at the same time unless the doctor told you to. Many pain medicines have acetaminophen, which is Tylenol. Too much acetaminophen (Tylenol) can be harmful. · Avoid citrus fruit, tomatoes, and other foods that contain acid. · Use over-the-counter ointments to numb sore areas in the mouth or on the lips. These include Orajel and Anbesol. · Do not kiss or have oral sex with anyone while you have a cold sore. To prevent cold sores in the future  · Avoid long exposure of your lips to the sun. (Wear a hat to help shade your mouth.)  · Do not kiss or have oral sex with someone who has a cold sore. Do not have sex or oral sex with someone who has a genital herpes outbreak. · Using lip balm that contains sunscreen may help reduce outbreaks of cold sores. · Avoid foods that seem to cause your cold sores to come back. · Do not share towels, razors, silverware, toothbrushes, or other objects with a person who has a cold sore. When should you call for help? Call your doctor now or seek immediate medical care if:  ? · Your symptoms are painful and you want to try antiviral medicine. ? · You have signs of infection, such as:  ¨ Increased pain, swelling, warmth, or redness. ¨ Red streaks leading from a cold sore. ¨ Pus draining from a cold sore. ¨ A fever. ? · You have a cold sore and develop eye pain, eye discharge, or any changes in your vision. ? Watch closely for changes in your health, and be sure to contact your doctor if:  ? · The cold sore does not heal in 7 to 10 days. ? · You get cold sores often. Where can you learn more? Go to http://mariusz-sim.info/. Enter L977 in the search box to learn more about \"Cold Sores: Care Instructions. \"  Current as of: March 20, 2017  Content Version: 11.4  © 9812-4538 [employer], [employer].  Care instructions adapted under license by [address] (which disclaims liability or warranty for this information). If you have questions about a medical condition or this instruction, always ask your healthcare professional. [address] any warranty or liability for your use of this information.

## 2018-03-30 RX ORDER — SERTRALINE HYDROCHLORIDE 100 MG/1
TABLET, FILM COATED ORAL
Qty: 90 TAB | Refills: 0 | Status: SHIPPED | OUTPATIENT
Start: 2018-03-30 | End: 2018-07-29 | Stop reason: SDUPTHER

## 2018-07-29 RX ORDER — SERTRALINE HYDROCHLORIDE 100 MG/1
TABLET, FILM COATED ORAL
Qty: 90 TAB | Refills: 0 | Status: SHIPPED | OUTPATIENT
Start: 2018-07-29 | End: 2018-11-28 | Stop reason: SDUPTHER

## 2018-08-05 RX ORDER — ACYCLOVIR 400 MG/1
TABLET ORAL
Qty: 25 TAB | Refills: 0 | Status: SHIPPED | OUTPATIENT
Start: 2018-08-05 | End: 2019-03-29 | Stop reason: SDUPTHER

## 2018-10-01 ENCOUNTER — APPOINTMENT (OUTPATIENT)
Dept: OBGYN CLINIC | Age: 43
End: 2018-10-01

## 2018-10-15 ENCOUNTER — OFFICE VISIT (OUTPATIENT)
Dept: OBGYN CLINIC | Age: 43
End: 2018-10-15

## 2018-10-15 VITALS
SYSTOLIC BLOOD PRESSURE: 106 MMHG | DIASTOLIC BLOOD PRESSURE: 66 MMHG | BODY MASS INDEX: 30.25 KG/M2 | HEIGHT: 66 IN | WEIGHT: 188.2 LBS

## 2018-10-15 DIAGNOSIS — Z01.419 WELL WOMAN EXAM WITH ROUTINE GYNECOLOGICAL EXAM: Primary | ICD-10-CM

## 2018-10-15 NOTE — MR AVS SNAPSHOT
[address] [facility] [address] [facility] 
[phone] Patient: Marely Rodriguez MRN: [id number] :1975 Visit Information Date & Time Provider Department Dept. Phone Encounter #  
 10/15/2018  2:00 PM Carmen Scott MD Amador Patrick [phone] [id number] Upcoming Health Maintenance Date Due Influenza Age 5 to Adult 2018 PAP AKA CERVICAL CYTOLOGY 2020 Allergies as of 10/15/2018  Review Complete On: 10/15/2018 By: Hulan Lundborg Severity Noted Reaction Type Reactions [place]  2014    Hives Penicillins  2014    Unknown (comments) Current Immunizations  Never Reviewed No immunizations on file. Not reviewed this visit Vitals BP Height(growth percentile) Weight(growth percentile) LMP BMI OB Status 106/66 5' 5.5\" (1.664 m) 188 lb 3.2 oz (85.4 kg) 2018 (Exact Date) 30.84 kg/m2 Having regular periods Smoking Status Never Smoker BMI and BSA Data Body Mass Index Body Surface Area  
 30.84 kg/m 2 1.99 m 2 Preferred Pharmacy Pharmacy Name Phone [facility] [address]  [facility] [phone] Your Updated Medication List  
  
   
This list is accurate as of 10/15/18  2:11 PM.  Always use your most recent med list.  
  
  
  
  
 * acyclovir 5 % ointment Commonly known as:  ZOVIRAX Apply  to affected area every three (3) hours. * acyclovir 400 mg tablet Commonly known as:  ZOVIRAX TAKE 1 TABLET BY MOUTH FOUR TIMES DAILY  
  
 aspirin delayed-release 81 mg tablet Take  by mouth daily. CALCIUM 500 PO Take  by mouth. FISH OIL PO Take  by mouth. * FOLGARD 0.8- mg-mg-mcg Tab Generic drug:  folic acid-vit H0-QTE E42 Take 1 Tab by mouth daily. * TL CARLOS RX 2.2-25-1 mg Tab Generic drug:  folic acid-vit T3-ESY X23 TAKE 1 TABLET BY MOUTH DAILY  
  
 multivitamin tablet Commonly known as:  ONE A DAY Take 1 Tab by mouth daily. sertraline 100 mg tablet Commonly known as:  ZOLOFT  
TAKE 1 TABLET BY MOUTH DAILY  
  
 VITAMIN E PO Take  by mouth. * Notice: This list has 4 medication(s) that are the same as other medications prescribed for you. Read the directions carefully, and ask your doctor or other care provider to review them with you. Patient Instructions Well Visit, Ages 25 to 48: Care Instructions Your Care Instructions Physical exams can help you stay healthy. Your doctor has checked your overall health and may have suggested ways to take good care of yourself. He or she also may have recommended tests. At home, you can help prevent illness with healthy eating, regular exercise, and other steps. Follow-up care is a key part of your treatment and safety. Be sure to make and go to all appointments, and call your doctor if you are having problems. It's also a good idea to know your test results and keep a list of the medicines you take. How can you care for yourself at home? · Reach and stay at a healthy weight. This will lower your risk for many problems, such as obesity, diabetes, heart disease, and high blood pressure. · Get at least 30 minutes of physical activity on most days of the week. Walking is a good choice. You also may want to do other activities, such as running, swimming, cycling, or playing tennis or team sports. Discuss any changes in your exercise program with your doctor. · Do not smoke or allow others to smoke around you. If you need help quitting, talk to your doctor about stop-smoking programs and medicines. These can increase your chances of quitting for good. · Talk to your doctor about whether you have any risk factors for sexually transmitted infections (STIs).  Having one sex partner (who does not have STIs and does not have sex with anyone else) is a good way to avoid these infections. · Use birth control if you do not want to have children at this time. Talk with your doctor about the choices available and what might be best for you. · Protect your skin from too much sun. When you're outdoors from 10 a.m. to 4 p.m., stay in the shade or cover up with clothing and a hat with a wide brim. Wear sunglasses that block UV rays. Even when it's cloudy, put broad-spectrum sunscreen (SPF 30 or higher) on any exposed skin. · See a dentist one or two times a year for checkups and to have your teeth cleaned. · Wear a seat belt in the car. · Drink alcohol in moderation, if at all. That means no more than 2 drinks a day for men and 1 drink a day for women. Follow your doctor's advice about when to have certain tests. These tests can spot problems early. For everyone · Cholesterol. Have the fat (cholesterol) in your blood tested after age 21. Your doctor will tell you how often to have this done based on your age, family history, or other things that can increase your risk for heart disease. · Blood pressure. Have your blood pressure checked during a routine doctor visit. Your doctor will tell you how often to check your blood pressure based on your age, your blood pressure results, and other factors. · Vision. Talk with your doctor about how often to have a glaucoma test. 
· Diabetes. Ask your doctor whether you should have tests for diabetes. · Colon cancer. Have a test for colon cancer at age 48. You may have one of several tests. If you are younger than 48, you may need a test earlier if you have any risk factors. Risk factors include whether you already had a precancerous polyp removed from your colon or whether your parent, brother, sister, or child has had colon cancer. For women · Breast exam and mammogram. Talk to your doctor about when you should have a clinical breast exam and a mammogram. Medical experts differ on whether and how often women under 50 should have these tests. Your doctor can help you decide what is right for you. · Pap test and pelvic exam. Begin Pap tests at age 24. A Pap test is the best way to find cervical cancer. The test often is part of a pelvic exam. Ask how often to have this test. 
· Tests for sexually transmitted infections (STIs). Ask whether you should have tests for STIs. You may be at risk if you have sex with more than one person, especially if your partners do not wear condoms. For men · Tests for sexually transmitted infections (STIs). Ask whether you should have tests for STIs. You may be at risk if you have sex with more than one person, especially if you do not wear a condom. · Testicular cancer exam. Ask your doctor whether you should check your testicles regularly. · Prostate exam. Talk to your doctor about whether you should have a blood test (called a PSA test) for prostate cancer. Experts differ on whether and when men should have this test. Some experts suggest it if you are older than 39 and are -American or have a father or brother who got prostate cancer when he was younger than 72. When should you call for help? Watch closely for changes in your health, and be sure to contact your doctor if you have any problems or symptoms that concern you. Where can you learn more? Go to http://mariusz-sim.info/. Enter P072 in the search box to learn more about \"Well Visit, Ages 25 to 48: Care Instructions. \" Current as of: March 29, 2018 Content Version: 11.8 © 7056-7620 Healthwise, Incorporated. Care instructions adapted under license by [employer] (which disclaims liability or warranty for this information).  If you have questions about a medical condition or this instruction, always ask your healthcare professional. [employer] disclaims any warranty or liability for your use of this information. Introducing [facility]! [employer] introduces Skycheckin patient portal. Now you can access parts of your medical record, email your doctor's office, and request medication refills online. 1. In your internet browser, go to [email or web address] Mother [email or web address] uma information [email or web address] Mother List 2. Click on the First Time User? Click Here link in the Sign In box. You will see the New Member Sign Up page. 3. Enter your Skycheckin Access Code exactly as it appears below. You will not need to use this code after youve completed the sign-up process. If you do not sign up before the expiration date, you must request a new code. · Skycheckin Access Code: [email or web address] Expires: 1/13/2019  2:11 PM 
 
4. Enter the last four digits of your Social Security Number (xxxx) and Date of Birth (mm/dd/yyyy) as indicated and click Submit. You will be taken to the next sign-up page. 5. Create a Skycheckin ID. This will be your Skycheckin login ID and cannot be changed, so think of one that is secure and easy to remember. 6. Create a Skycheckin password. You can change your password at any time. 7. Enter your Password Reset Question and Answer. This can be used at a later time if you forget your password. 8. Enter your e-mail address. You will receive e-mail notification when new information is available in [address] 9. Click Sign Up. You can now view and download portions of your medical record. 10. Click the Download Summary menu link to download a portable copy of your medical information. If you have questions, please visit the Frequently Asked Questions section of the Skycheckin website. Remember, Skycheckin is NOT to be used for urgent needs. For medical emergencies, dial 911. Now available from your iPhone and Android! Please provide this summary of care documentation to your next provider. Your primary care clinician is listed as MADINA Chin. If you have any questions after today's visit, please call [phone].

## 2018-10-15 NOTE — PROGRESS NOTES
[facility]  [email or web address]  [phone]    Faustino Gannon MD, [address]       Annual Gynecologic Exam  WWE 40-60  Chief Complaint   Patient presents with    Well Woman       Kristin Valencia is a 36 y.o.  [facility]  female who presents for an annual exam.  Patient's last menstrual period was 2018 (exact date). .    She does not report additional concerns today. Menstrual status:  Her periods are heavy. She does report dysmenorrhea/painful menses. She does not report irregular bleeding. Sexual history and Contraception:  History   Sexual Activity    Sexual activity: Yes    Partners: Male    Birth control/ protection: Surgical     Comment: Vasectomy       She does not reports new sexual partner(s) in the last year. The patient does not request STD testing. Preventive Medicine History:  Her most recent Pap smear result: normal was obtained in 2015    Her most recent HR HPV screen was Negative obtained in     She does not have a history of DEDE 2, 3 or cervical cancer. Breast health:  Last mammogram: approximate date 10/1/18 and was normal.   A mammogram was not scheduled for today. Breast cancer family updated: see . Bone health: Kristina Grajeda She does not have a history of osteopenia/osteoporosis. Osteoporosis family history updated: see .      Past Medical History:   Diagnosis Date    Abnormal Pap smear     HPV and colpo wnl fu    Anxiety     Anxiety associated with depression 2017    Fibrocystic breast     History of mammogram 9/23/15; 17; 10/1/18    Negative; Negative; Negative (dense)    Lipoma     forehead and shoulder removed in     MTHFR mutation ([address]     MTHFR mutation ([address] 2017    Pap smear for cervical cancer screening 3/24/10; 9/23/15    neg hpv neg; Negative, HPV negative    Protein C deficiency ([address] 2017    And a prothrombin gene mutation    Recurrent cold sores 2017     OB History    Para Term  AB Living   4 2 2 0 2 2   SAB TAB Ectopic Molar Multiple Live Births   2 0 0  0       # Outcome Date GA Lbr Ruiz/2nd Weight Sex Delivery Anes PTL Lv   4 SAB            3 SAB            2 Term            1 Term                   Past Surgical History:   Procedure Laterality Date    HX BREAST LUMPECTOMY      HX COLPOSCOPY      HX OTHER SURGICAL      facial surgery    HX SHOULDER ARTHROSCOPY      lump removed- lipoma    HX TONSILLECTOMY       Family History   Problem Relation Age of Onset    Hypertension Father     High Cholesterol Father     Stroke Paternal Grandmother      Social History     Social History    Marital status:      Spouse name: N/A    Number of children: N/A    Years of education: N/A     Occupational History    Not on file. Social History Main Topics    Smoking status: Never Smoker    Smokeless tobacco: Never Used    Alcohol use No      Comment: socially    Drug use: No    Sexual activity: Yes     Partners: Male     Birth control/ protection: Surgical      Comment: Vasectomy     Other Topics Concern    Not on file     Social History Narrative       Allergies   Allergen Reactions    Aamir Hives    Penicillins Unknown (comments)       Current Outpatient Prescriptions   Medication Sig    acyclovir (ZOVIRAX) 400 mg tablet TAKE 1 TABLET BY MOUTH FOUR TIMES DAILY    sertraline (ZOLOFT) 100 mg tablet TAKE 1 TABLET BY MOUTH DAILY    TL CARLOS RX 2.2-25-1 mg tab TAKE 1 TABLET BY MOUTH DAILY    acyclovir (ZOVIRAX) 5 % ointment Apply  to affected area every three (3) hours.  multivitamin (ONE A DAY) tablet Take 1 Tab by mouth daily.  VITAMIN E ACETATE (VITAMIN E PO) Take  by mouth.  aspirin delayed-release 81 mg tablet Take  by mouth daily.  CALCIUM CARBONATE (CALCIUM 500 PO) Take  by mouth.  DOCOSAHEXANOIC ACID/EPA (FISH OIL PO) Take  by mouth.     folic acid-vit K1-JYW I61 (FOLGARD) 0.8- mg-mg-mcg tab Take 1 Tab by mouth daily. No current facility-administered medications for this visit.         Patient Active Problem List   Diagnosis Code    Anxiety associated with depression F41.8    Protein C deficiency ([facility] D68.59    Recurrent cold sores B00.1    Fibrocystic breast N60.19    MTHFR mutation ([facility] E72.12       Review of Systems - History obtained from the patient  Constitutional: negative for weight loss, fever, night sweats  HEENT: negative for hearing loss, earache, congestion, snoring, sorethroat  CV: negative for chest pain, palpitations, edema  Resp: negative for cough, shortness of breath, wheezing  GI: negative for change in bowel habits, abdominal pain, black or bloody stools  : negative for frequency, dysuria, hematuria, vaginal discharge  MSK: negative for back pain, joint pain, muscle pain  Breast: negative for breast lumps, nipple discharge, galactorrhea  Skin :negative for itching, rash, hives  Neuro: negative for dizziness, headache, confusion, weakness  Psych: negative for anxiety, depression, change in mood  Heme/lymph: negative for bleeding, bruising, pallor    Physical Exam  Visit Vitals    /66    Ht 5' 5.5\" (1.664 m)    Wt 188 lb 3.2 oz (85.4 kg)    LMP 09/18/2018 (Exact Date)    BMI 30.84 kg/m2       Constitutional  · Appearance: well-nourished, well developed, alert, in no acute distress    HENT  · Head and Face: appears normal    Neck  · Inspection/Palpation: normal appearance, no masses or tenderness  · Lymph Nodes: no lymphadenopathy present  · Thyroid: gland size normal, nontender, no nodules or masses present on palpation    Chest  · Respiratory Effort: breathing unlabored  · Auscultation: normal breath sounds    Cardiovascular  · Heart:  · Auscultation: regular rate and rhythm without murmur    Breasts  · Inspection of Breasts: breasts symmetrical, no skin changes, no discharge present, nipple appearance normal, no skin retraction present  · Palpation of Breasts and Axillae: no masses present on palpation, no breast tenderness  · Axillary Lymph Nodes: no lymphadenopathy present    Gastrointestinal  · Abdominal Examination: abdomen non-tender to palpation, normal bowel sounds, no masses present  · Liver and spleen: no hepatomegaly present, spleen not palpable  · Hernias: no hernias identified    Genitourinary  · External Genitalia: normal appearance for age, no discharge present, no tenderness present, no inflammatory lesions present, no masses present, without atrophy present  · Vagina: normal vaginal vault without central or paravaginal defects, no discharge present, no inflammatory lesions present, no masses present  · Bladder: non-tender to palpation  · Urethra: appears normal  · Cervix: normal   · Uterus: normal size, shape and consistency  · Adnexa: no adnexal tenderness present, no adnexal masses present  · Perineum: perineum within normal limits, no evidence of trauma, no rashes or skin lesions present  · Anus: anus within normal limits, no hemorrhoids present  · Inguinal Lymph Nodes: no lymphadenopathy present    Skin  · General Inspection: no rash, no lesions identified    Neurologic/Psychiatric  · Mental Status:  · Orientation: grossly oriented to person, place and time  · Mood and Affect: mood normal, affect appropriate    Assessment:  36 y.o.  for well woman exam  Encounter Diagnosis   Name Primary?     Well woman exam with routine gynecological exam Yes       Plan:  The patient was counseled about diet, exercise, healthy lifestyle  We discussed current self breast exam and mammogram recommendations  We discussed current pap smear and HR HPV testing guidelines  We recommend follow up in one year for routine annual gynecologic exam or sooner if needed  We recommend follow up with a primary care physician for any chronic medical problems or non-gynecologic concerns    We discussed calcium/vitamin D/weight bearing exercise and osteoporosis prevention  Handouts were given to the patient       Folllow up:  [x] return for annual well woman exam in one year or sooner if she is having problems  [] follow up and ultrasound  [x] mammogram  [] 6 months  [] 3 months  [] 1 month    Orders Placed This Encounter    PAP IG, HPV AND RFX HPV 13/97,75([id number])       No results found for any visits on 10/15/18.

## 2018-10-15 NOTE — PATIENT INSTRUCTIONS

## 2018-10-17 LAB
CYTOLOGIST CVX/VAG CYTO: NORMAL
CYTOLOGY CVX/VAG DOC THIN PREP: NORMAL
CYTOLOGY HISTORY:: NORMAL
DX ICD CODE: NORMAL
HPV I/H RISK 1 DNA CVX QL PROBE+SIG AMP: NEGATIVE
Lab: NORMAL
OTHER STN SPEC: NORMAL
PATH REPORT.FINAL DX SPEC: NORMAL
STAT OF ADQ CVX/VAG CYTO-IMP: NORMAL

## 2018-11-28 RX ORDER — SERTRALINE HYDROCHLORIDE 100 MG/1
TABLET, FILM COATED ORAL
Qty: 90 TAB | Refills: 0 | Status: SHIPPED | OUTPATIENT
Start: 2018-11-28 | End: 2019-03-29 | Stop reason: SDUPTHER

## 2019-04-01 RX ORDER — ACYCLOVIR 50 MG/G
OINTMENT TOPICAL
Qty: 2 G | Refills: 0 | Status: SHIPPED | OUTPATIENT
Start: 2019-04-01

## 2019-04-01 RX ORDER — SERTRALINE HYDROCHLORIDE 100 MG/1
TABLET, FILM COATED ORAL
Qty: 90 TAB | Refills: 0 | Status: SHIPPED | OUTPATIENT
Start: 2019-04-01 | End: 2019-05-03 | Stop reason: ALTCHOICE

## 2019-04-01 RX ORDER — ACYCLOVIR 400 MG/1
TABLET ORAL
Qty: 25 TAB | Refills: 0 | Status: SHIPPED | OUTPATIENT
Start: 2019-04-01 | End: 2020-01-24

## 2019-05-03 ENCOUNTER — OFFICE VISIT (OUTPATIENT)
Dept: INTERNAL MEDICINE CLINIC | Age: 44
End: 2019-05-03

## 2019-05-03 VITALS
OXYGEN SATURATION: 98 % | HEIGHT: 66 IN | TEMPERATURE: 97.7 F | SYSTOLIC BLOOD PRESSURE: 118 MMHG | DIASTOLIC BLOOD PRESSURE: 80 MMHG | BODY MASS INDEX: 30.57 KG/M2 | WEIGHT: 190.2 LBS | RESPIRATION RATE: 18 BRPM | HEART RATE: 54 BPM

## 2019-05-03 DIAGNOSIS — Z00.00 ROUTINE PHYSICAL EXAMINATION: Primary | ICD-10-CM

## 2019-05-03 DIAGNOSIS — N39.0 URINARY TRACT INFECTION WITHOUT HEMATURIA, SITE UNSPECIFIED: ICD-10-CM

## 2019-05-03 DIAGNOSIS — D68.59 PROTEIN C DEFICIENCY (HCC): ICD-10-CM

## 2019-05-03 DIAGNOSIS — F41.8 ANXIETY ASSOCIATED WITH DEPRESSION: ICD-10-CM

## 2019-05-03 DIAGNOSIS — Z15.89 MTHFR MUTATION: ICD-10-CM

## 2019-05-03 DIAGNOSIS — Z23 ENCOUNTER FOR IMMUNIZATION: ICD-10-CM

## 2019-05-03 LAB
A-G RATIO,AGRAT: 2 RATIO
ALBUMIN SERPL-MCNC: 4.7 G/DL (ref 3.9–5.4)
ALP SERPL-CCNC: 76 U/L (ref 38–126)
ALT SERPL-CCNC: 16 U/L (ref 9–52)
ANION GAP SERPL CALC-SCNC: 16 MMOL/L
AST SERPL W P-5'-P-CCNC: 20 U/L (ref 14–36)
BACTERIA,BACTU: ABNORMAL
BILIRUB SERPL-MCNC: 0.5 MG/DL (ref 0.2–1.3)
BILIRUB UR QL: NEGATIVE
BUN SERPL-MCNC: 14 MG/DL (ref 7–17)
BUN/CREATININE RATIO,BUCR: 20 RATIO
CALCIUM SERPL-MCNC: 9.9 MG/DL (ref 8.4–10.2)
CHLORIDE SERPL-SCNC: 106 MMOL/L (ref 98–107)
CHOL/HDL RATIO,CHHD: 2 RATIO (ref 0–4)
CHOLEST SERPL-MCNC: 180 MG/DL (ref 0–200)
CLARITY: CLEAR
CO2 SERPL-SCNC: 23 MMOL/L (ref 22–32)
COLOR UR: ABNORMAL
CREAT SERPL-MCNC: 0.7 MG/DL (ref 0.7–1.2)
ERYTHROCYTE [DISTWIDTH] IN BLOOD BY AUTOMATED COUNT: 12.6 %
GLOBULIN,GLOB: 2.4
GLUCOSE 24H UR-MRATE: NEGATIVE G/(24.H)
GLUCOSE SERPL-MCNC: 81 MG/DL (ref 65–105)
HCT VFR BLD AUTO: 41.5 % (ref 37–51)
HDLC SERPL-MCNC: 85 MG/DL (ref 35–130)
HGB BLD-MCNC: 14 G/DL (ref 12–18)
HGB UR QL STRIP: NEGATIVE
KETONES UR QL STRIP.AUTO: NEGATIVE
LDL/HDL RATIO,LDHD: 1 RATIO
LDLC SERPL CALC-MCNC: 86 MG/DL (ref 0–130)
LEUKOCYTE ESTERASE: ABNORMAL
LYMPHOCYTES ABSOLUTE: 2.7 K/UL (ref 0.6–4.1)
LYMPHOCYTES NFR BLD: 32.4 % (ref 10–58.5)
MCH RBC QN AUTO: 31.5 PG (ref 26–32)
MCHC RBC AUTO-ENTMCNC: 33.7 G/DL (ref 30–36)
MCV RBC AUTO: 93.5 FL (ref 80–97)
MONOCYTES ABS-DIF,2141: 0.6 K/UL (ref 0–1.8)
MONOCYTES NFR BLD: 7.5 % (ref 0.1–24)
NEUTROPHILS # BLD: 60.1 % (ref 37–92)
NEUTROPHILS ABS,2156: 4.9 K/UL (ref 2–7.8)
NITRITE UR QL STRIP.AUTO: NEGATIVE
PH UR STRIP: 6 [PH] (ref 5–7)
PLATELET # BLD AUTO: 368 K/UL (ref 140–440)
PMV BLD AUTO: 7.9 FL
POTASSIUM SERPL-SCNC: 4.4 MMOL/L (ref 3.6–5)
PROT SERPL-MCNC: 7.1 G/DL (ref 6.3–8.2)
PROT UR STRIP-MCNC: NEGATIVE MG/DL
RBC # BLD AUTO: 4.44 M/UL (ref 4.2–6.3)
RBC #/AREA URNS HPF: ABNORMAL #/HPF
SODIUM SERPL-SCNC: 145 MMOL/L (ref 137–145)
SP GR UR REFRACTOMETRY: 1.01 (ref 1–1.03)
SQUAMOUS EPITHELIAL CELLS: ABNORMAL
TRIGL SERPL-MCNC: 44 MG/DL (ref 0–200)
UROBILINOGEN UR QL STRIP.AUTO: NEGATIVE
VLDLC SERPL CALC-MCNC: 9 MG/DL
WBC # BLD AUTO: 8.2 K/UL (ref 4.1–10.9)
WBC URNS QL MICRO: ABNORMAL #/HPF

## 2019-05-03 NOTE — PATIENT INSTRUCTIONS
Learning About Cutting Calories How do calories affect your weight? Food gives your body energy. Energy from the food you eat is measured in calories. This energy keeps your heart beating, your brain active, and your muscles working. Your body needs a certain number of calories each day. After your body uses the calories it needs, it stores extra calories as fat. To lose weight safely, you have to eat fewer calories while eating in a healthy way. How many calories do you need each day? The more active you are, the more calories you need. When you are less active, you need fewer calories. How many calories you need each day also depends on several things, including your age and whether you are male or female. Here are some general guidelines for adults: 
· Less active women and older adults need 1,600 to 2,000 calories each day. · Active women and less active men need 2,000 to 2,400 calories each day. · Active men need 2,400 to 3,000 calories each day. How can you cut calories and eat healthy meals? Whole grains, vegetables and fruits, and dried beans are good lower-calorie foods. They give you lots of nutrients and fiber. And they fill you up. Sweets, energy drinks, and soda pop are high in calories. They give you few nutrients and no fiber. Try to limit soda pop, fruit juice, and energy drinks. Drink water instead. Some fats can be part of a healthy diet. But cutting back on fats from highly processed foods like fast foods and many snack foods is a good way to lower the calories in your diet. Also, use smaller amounts of fats like butter, margarine, salad dressing, and mayonnaise. Add fresh garlic, lemon, or herbs to your meals to add flavor without adding fat. Meats and dairy products can be a big source of hidden fats. Try to choose lean or low-fat versions of these products.  
Fat-free cookies, candies, chips, and frozen treats can still be high in sugar and calories. Some fat-free foods have more calories than regular ones. Eat fat-free treats in moderation, as you would other foods. If your favorite foods are high in fat, salt, sugar, or calories, limit how often you eat them. Eat smaller servings, or look for healthy substitutes. Fill up on fruits, vegetables, and whole grains. Eating at home · Use meat as a side dish instead of as the main part of your meal. 
· Try main dishes that use whole wheat pasta, brown rice, dried beans, or vegetables. · Find ways to cook with little or no fat, such as broiling, steaming, or grilling. · Use cooking spray instead of oil. If you use oil, use a monounsaturated oil, such as canola or olive oil. · Trim fat from meats before you cook them. · Drain off fat after you brown the meat or while you roast it. · Chill soups and stews after you cook them. Then skim the fat off the top after it hardens. Eating out · Order foods that are broiled or poached rather than fried or breaded. · Cut back on the amount of butter or margarine that you use on bread. · Order sauces, gravies, and salad dressings on the side, and use only a little. · When you order pasta, choose tomato sauce rather than cream sauce. · Ask for salsa with your baked potato instead of sour cream, butter, cheese, or tobar. · Order meals in a small size instead of upgrading to a large. · Share an entree, or take part of your food home to eat as another meal. 
· Share appetizers and desserts. Where can you learn more? Go to http://mariusz-sim.info/. Enter [id number] in the search box to learn more about \"Learning About Cutting Calories. \" Current as of: March 28, 2018 Content Version: 11.9 © 9307-3368 [employer]. Care instructions adapted under license by [employer] (which disclaims liability or warranty for this information).  If you have questions about a medical condition or this instruction, always ask your healthcare professional. [address] any warranty or liability for your use of this information. Vaccine Information Statement Tdap (Tetanus, Diphtheria, Pertussis) Vaccine: What You Need to Know Many Vaccine Information Statements are available in [place] and other languages. See www.immunize.org/vis. Hojas de Información Sobre Vacunas están disponibles en español y en muchos otros idiomas. Visite WorthScale.si 1. Why get vaccinated? Tetanus, diphtheria, and pertussis are very serious diseases. Tdap vaccine can protect us from these diseases. And, Tdap vaccine given to pregnant women can protect  babies against pertussis. TETANUS (Lockjaw) is rare in the [facility] today. It causes painful muscle tightening and stiffness, usually all over the body. ? It can lead to tightening of muscles in the head and neck so you cant open your mouth, swallow, or sometimes even breathe. Tetanus kills about 1 out of 10 people who are infected even after receiving the best medical care. DIPHTHERIA is also rare in the [facility] today. It can cause a thick coating to form in the back of the throat. ? It can lead to breathing problems, heart failure, paralysis, and death. PERTUSSIS (Whooping Cough) causes severe coughing spells, which can cause difficulty breathing, vomiting, and disturbed sleep. ? It can also lead to weight loss, incontinence, and rib fractures. Up to 2 in 100 adolescents and 5 in 100 adults with pertussis are hospitalized or have complications, which could include pneumonia or death. These diseases are caused by bacteria. Diphtheria and pertussis are spread from person to person through secretions from coughing or sneezing. Tetanus enters the body through cuts, scratches, or wounds.  
 
Before vaccines, as many as 200,000 cases of diphtheria, 200,000 cases of pertussis, and hundreds of cases of tetanus, were reported in the [place] each year. Since vaccination began, reports of cases for tetanus and diphtheria have dropped by about 99% and for pertussis by about 80%. 2. Tdap vaccine Tdap vaccine can protect adolescents and adults from tetanus, diphtheria, and pertussis. One dose of Tdap is routinely given at age 6 or 15. People who did not get Tdap at that age should get it as soon as possible. Tdap is especially important for health care professionals and anyone having close contact with a baby younger than 12 months. Pregnant women should get a dose of Tdap during every pregnancy, to protect the  from pertussis. Infants are most at risk for severe, life-threatening complications from pertussis. Another vaccine, called Td, protects against tetanus and diphtheria, but not pertussis. A Td booster should be given every 10 years. Tdap may be given as one of these boosters if you have never gotten Tdap before. Tdap may also be given after a severe cut or burn to prevent tetanus infection. Your doctor or the person giving you the vaccine can give you more information. Tdap may safely be given at the same time as other vaccines. 3. Some people should not get this vaccine  A person who has ever had a life-threatening allergic reaction after a previous dose of any diphtheria, tetanus or pertussis containing vaccine, OR has a severe allergy to any part of this vaccine, should not get Tdap vaccine. Tell the person giving the vaccine about any severe allergies.  Anyone who had coma or long repeated seizures within 7 days after a childhood dose of DTP or DTaP, or a previous dose of Tdap, should not get Tdap, unless a cause other than the vaccine was found. They can still get Td.  
 
 Talk to your doctor if you: 
- have seizures or another nervous system problem, 
 - had severe pain or swelling after any vaccine containing diphtheria, tetanus or pertussis,  
- ever had a condition called Guillain Barré Syndrome (GBS), 
- arent feeling well on the day the shot is scheduled. 4. Risks With any medicine, including vaccines, there is a chance of side effects. These are usually mild and go away on their own. Serious reactions are also possible but are rare. Most people who get Tdap vaccine do not have any problems with it. Mild Problems following Tdap 
(Did not interfere with activities)  Pain where the shot was given (about 3 in 4 adolescents or 2 in 3 adults)  Redness or swelling where the shot was given (about 1 person in 5)  Mild fever of at least 100.4°F (up to about 1 in 25 adolescents or 1 in 100 adults)  Headache (about 3 or 4 people in 10)  Tiredness (about 1 person in 3 or 4)  Nausea, vomiting, diarrhea, stomach ache (up to 1 in 4 adolescents or 1 in 10 adults)  Chills,  sore joints (about 1 person in 10)  Body aches (about 1 person in 3 or 4)  Rash, swollen glands (uncommon) Moderate Problems following Tdap (Interfered with activities, but did not require medical attention)  Pain where the shot was given (up to 1 in 5 or 6)  Redness or swelling where the shot was given (up to about 1 in 16 adolescents or 1 in 12 adults)  Fever over 102°F (about 1 in 100 adolescents or 1 in 250 adults)  Headache (about 1 in 7 adolescents or 1 in 10 adults)  Nausea, vomiting, diarrhea, stomach ache (up to 1 or 3 people in 100)  Swelling of the entire arm where the shot was given (up to about 1 in 500). Severe Problems following Tdap 
(Unable to perform usual activities; required medical attention)  Swelling, severe pain, bleeding, and redness in the arm where the shot was given (rare). Problems that could happen after any vaccine:  People sometimes faint after a medical procedure, including vaccination. Sitting or lying down for about 15 minutes can help prevent fainting, and injuries caused by a fall. Tell your doctor if you feel dizzy, or have vision changes or ringing in the ears.  Some people get severe pain in the shoulder and have difficulty moving the arm where a shot was given. This happens very rarely.  Any medication can cause a severe allergic reaction. Such reactions from a vaccine are very rare, estimated at fewer than 1 in a million doses, and would happen within a few minutes to a few hours after the vaccination. As with any medicine, there is a very remote chance of a vaccine causing a serious injury or death. The safety of vaccines is always being monitored. For more information, visit: www.cdc.gov/vaccinesafety/ 
 
 
The [facility] Vaccine Injury Compensation Program (VICP) is a federal program that was created to compensate people who may have been injured by certain vaccines.  
 
Persons who believe they may have been injured by a vaccine can learn about the program and about filing a claim by calling 9-243.835.7576 or visiting the [facility] [employer] website at www.[facility].gov/vaccinecompensation. There is a time limit to file a claim for compensation. 7. How can I learn more?  Ask your doctor. He or she can give you the vaccine package insert or suggest other sources of information.  Call your local or state health department.  Contact the Centers for Disease Control and Prevention (CDC): 
- Call 5-941.207.3709 (1-800-CDC-INFO) or 
- Visit CDCs website at www.cdc.gov/vaccines Vaccine Information Statement Tdap Vaccine 
(2/24/2015) [address] 119SP-05 Department of [facility] and [employer] Centers for Disease Control and Prevention Office Use Only

## 2019-05-03 NOTE — PROGRESS NOTES
Subjective: This note will not be viewable in [address] 39 y.o. female for annual Comprehensive personal healthcare examination. Mrs. Jaime Naik is a 65-year-old  female mother of 2 who presents to the office today for complete checkup. Patient's medical history is pertinent for protein C deficiency and MTHFR mutation. Patient has not had blood clots in the past.  She does follow a low-cholesterol diet and does take a folguard. She remains physically active and denies any cardiac problems. She has a history of anxiety associated with depression for which she was on Zoloft. Patient ran out of the medication and took herself off of it and is been feeling fine since she has had no exacerbations of those symptoms. She has a history of HSV1 infections recurrently and does take acyclovir and Zovirax as needed. She is up-to-date on Pap testing and mammography. History of present illness: This patient has multiple medical problems. These include:  
Patient Active Problem List  
Diagnosis Code  Anxiety associated with depression F41.8  Protein C deficiency ([facility] D68.59  
 Recurrent cold sores B00.1  Fibrocystic breast N60.19  
 MTHFR mutation ([facility]) E72.12 Past Medical History:  
Diagnosis Date  Abnormal Pap smear 1993 HPV and colpo wnl fu  Anxiety  Anxiety associated with depression 12/13/2017  Fibrocystic breast   
 History of mammogram 9/23/15; 9/29/17; 10/1/18 Negative; Negative; Negative (dense)  Lipoma   
 forehead and shoulder removed in 2008  MTHFR mutation ([facility]  MTHFR mutation ([facility] 12/20/2017  Pap smear for cervical cancer screening 3/24/10; 9/23/15; 10/15/18  
 neg hpv neg; Negative, HPV negative;  negative/HPV neg  Protein C deficiency ([address] 12/13/2017 And a prothrombin gene mutation  Recurrent cold sores 12/13/2017 Past Surgical History:  
Procedure Laterality Date [address]  HX OTHER SURGICAL    
 facial surgery  HX SHOULDER ARTHROSCOPY  2008  
 lump removed- lipoma  HX TONSILLECTOMY Allergies Allergen Reactions [address]  Penicillins Unknown (comments) Current Outpatient Medications Medication Sig Dispense Refill  vit D3-folic [id number] (FOLGARD) 2,000-800-0.32 unit-mcg-mg tab Take 1 Tab by mouth daily. 90 Tab 3  
 acyclovir (ZOVIRAX) 400 mg tablet TAKE 1 TABLET BY MOUTH FOUR TIMES DAILY 25 Tab 0  
 acyclovir (ZOVIRAX) 5 % ointment Apply  to affected area every three (3) hours. 2 g 0  
 multivitamin (ONE A DAY) tablet Take 1 Tab by mouth daily.  VITAMIN E ACETATE (VITAMIN E PO) Take  by mouth.  aspirin delayed-release 81 mg tablet Take  by mouth daily.  CALCIUM CARBONATE (CALCIUM 500 PO) Take  by mouth.  DOCOSAHEXANOIC ACID/EPA (FISH OIL PO) Take  by mouth. Social History Socioeconomic History  Marital status:  Spouse name: Not on file  Number of children: 2  
 Years of education: Not on file  Highest education level: Not on file Occupational History  Occupation: Sorcing specialist  
Tobacco Use  Smoking status: Never Smoker  Smokeless tobacco: Never Used Substance and Sexual Activity  Alcohol use: No  
  Comment: socially  Drug use: No  
 Sexual activity: Yes  
  Partners: Male Birth control/protection: Surgical  
  Comment: Vasectomy Family History Problem Relation Age of Onset  Hypertension Father  High Cholesterol Father  Stroke Paternal Grandmother Health Maintenance Topic Date Due  
 DTaP/Tdap/Td series (1 - Tdap) 10/25/1995  Influenza Age 5 to Adult  08/01/2019  PAP AKA CERVICAL CYTOLOGY  10/15/2023  Pneumococcal 0-64 years  Aged Out Review of Systems Constitutional:  She denies fever, weight loss, sweats or fatigue. HEENT:  No blurred or double vision, headache or dizziness.   No difficulty with swallowing, taste, speech or smell. Respiratory:  No cough, wheezing or shortness of breath. No sputum production. Cardiac:  Denies chest pain, palpitations, unexplained indigestion, syncope, edema, PND or orthopnea. GI:  No changes in bowel movements, no abdominal pain, no bloating, anorexia, nausea, vomiting or heartburn. :  No frequency or dysuria. Denies incontinence. Extremities:  No joint pain, stiffness or swelling. Skin:  No recent rashes or mole changes. Neurological:  No numbness, tingling, burning paresthesias or loss of motor strength. No syncope, dizziness, frequent headaches or memory loss. ROS otherwise negative Objective:  
 
Vitals:  
 05/03/19 1100 BP: 118/80 Pulse: (!) 54 Resp: 18 Temp: 97.7 °F (36.5 °C) TempSrc: Oral  
SpO2: 98% Weight: 190 lb 3.2 oz (86.3 kg) Height: 5' 5.5\" (1.664 m) PainSc:   0 - No pain Body mass index is 31.17 kg/m². Physical Examination:  
General Appearance:  Well-developed, well-nourished, no acute distress. Vision:  Deferred to ophthalmologist.      
HEENT:   
Ears:  The TMs and ear canals were clear. Eyes:  The pupillary responses were normal.  Extraocular muscle function intact. Lids and conjunctiva not injected. No conjunctiva redness or drainage. Pharynx:  Clear with teeth in good repair. No masses were noted. Neck:  Supple without thyromegaly or adenopathy. No JVD noted. No carotid bruits. Lungs:  Clear to auscultation and percussion. Cardiac:  Regular rate and rhythm without murmur. PMI not displaced. No gallop, rub or click. Abdomen:  Flat, soft, non-tender without palpable organomegaly or mass. No pulsatile mass was felt, and no bruit was heard. Bowel sounds were active. Breasts:  Deferred to GYN 
GYN: Deferred to GYN Rectal exam: Deferred to GYN Extremities:  No clubbing, cyanosis or edema. Pulses:  Dorsalis pedis and posterior tibial pulses felt without difficulty. Skin:  No rash or unusual mole changes noted. Lymph Nodes:  None felt in the cervical, supraclavicular, axillary or inguinal region. Neurological:  Cranial nerves II-XII grossly intact. Motor strength 5/5. DTRs 2+ and symmetric. Station and gait normal. 
Physical exam otherwise negative Assessment/Plan:  
 
Diagnoses and all orders for this visit: 
 
Routine physical examination 
-     COLLECTION VENOUS BLOOD,VENIPUNCTURE 
-     CBC WITH AUTOMATED DIFF 
-     LIPID PANEL 
-     METABOLIC PANEL, COMPREHENSIVE 
-     TSH 3RD GENERATION 
-     URINALYSIS W/MICROSCOPIC MTHFR mutation ([facility] 
-     vit D3-folic [id number] (FOLGARD) 2,000-800-0.32 unit-mcg-mg tab; Take 1 Tab by mouth daily. , Print, Disp-90 Tab, R-3 Protein C deficiency ([facility] Anxiety associated with depression Encounter for immunization -     TETANUS, DIPHTHERIA TOXOIDS AND ACELLULAR PERTUSSIS VACCINE (TDAP), IN INDIVIDS. >=7, IM 
-     [place] IMMUNIZ ADMIN,1 SINGLE/COMB VAC/TOXOID Other instructions: The patient's medications were reviewed and reconciled. No change in her current medical regimen is made. Body mass index is 31.17 and dietary counseling along with printed patient education is given Await results of multiple labs Follow-up yearly Follow-up and Dispositions · Return in about 1 year (around 5/3/2020). Alie Allison MD

## 2019-05-03 NOTE — PROGRESS NOTES
Sharita Hannon is a 39 y.o. female presenting for Complete Physical 
. 1. Have you been to the ER, urgent care clinic since your last visit? Hospitalized since your last visit? No 
 
2. Have you seen or consulted any other health care providers outside of the [address] [place] [address] since your last visit? Include any pap smears or colon screening. No 
 
No flowsheet data found. No flowsheet data found. No flowsheet data found. There are no discontinued medications.

## 2019-05-04 LAB — TSH SERPL DL<=0.05 MIU/L-ACNC: 0.98 UIU/ML (ref 0.34–5.6)

## 2019-05-05 LAB — BACTERIA UR CULT: NO GROWTH

## 2019-05-06 ENCOUNTER — TELEPHONE (OUTPATIENT)
Dept: INTERNAL MEDICINE CLINIC | Age: 44
End: 2019-05-06

## 2019-05-06 NOTE — TELEPHONE ENCOUNTER
Patient is calling in regards to a missed call from [place] regarding her most recent lab results.     Best call back # for patient: [phone]

## 2019-12-19 RX ORDER — CYANOCOBALAMIN/FOLIC AC/VIT B6 1-2.2-25MG
TABLET ORAL
Qty: 90 TAB | Refills: 0 | Status: SHIPPED | OUTPATIENT
Start: 2019-12-19 | End: 2020-04-22

## 2019-12-19 RX ORDER — SERTRALINE HYDROCHLORIDE 100 MG/1
TABLET, FILM COATED ORAL
Qty: 90 TAB | Refills: 0 | Status: SHIPPED | OUTPATIENT
Start: 2019-12-19 | End: 2020-03-30

## 2020-01-24 RX ORDER — ACYCLOVIR 400 MG/1
TABLET ORAL
Qty: 25 TAB | Refills: 0 | Status: SHIPPED | OUTPATIENT
Start: 2020-01-24 | End: 2020-04-04

## 2020-03-30 RX ORDER — SERTRALINE HYDROCHLORIDE 100 MG/1
TABLET, FILM COATED ORAL
Qty: 90 TAB | Refills: 0 | Status: SHIPPED | OUTPATIENT
Start: 2020-03-30 | End: 2020-07-05

## 2020-04-04 RX ORDER — ACYCLOVIR 400 MG/1
TABLET ORAL
Qty: 25 TAB | Refills: 0 | Status: SHIPPED | OUTPATIENT
Start: 2020-04-04 | End: 2020-12-07

## 2020-04-22 RX ORDER — CYANOCOBALAMIN/FOLIC AC/VIT B6 1-2.2-25MG
TABLET ORAL
Qty: 90 TAB | Refills: 0 | Status: SHIPPED | OUTPATIENT
Start: 2020-04-22 | End: 2020-08-28

## 2020-07-05 RX ORDER — SERTRALINE HYDROCHLORIDE 100 MG/1
TABLET, FILM COATED ORAL
Qty: 90 TAB | Refills: 0 | Status: SHIPPED | OUTPATIENT
Start: 2020-07-05 | End: 2020-11-02

## 2020-07-27 ENCOUNTER — OFFICE VISIT (OUTPATIENT)
Dept: INTERNAL MEDICINE CLINIC | Age: 45
End: 2020-07-27

## 2020-07-27 VITALS
TEMPERATURE: 98.3 F | SYSTOLIC BLOOD PRESSURE: 116 MMHG | WEIGHT: 198.4 LBS | OXYGEN SATURATION: 97 % | RESPIRATION RATE: 16 BRPM | DIASTOLIC BLOOD PRESSURE: 70 MMHG | BODY MASS INDEX: 31.88 KG/M2 | HEART RATE: 61 BPM | HEIGHT: 66 IN

## 2020-07-27 DIAGNOSIS — Z00.00 ROUTINE PHYSICAL EXAMINATION: Primary | ICD-10-CM

## 2020-07-27 DIAGNOSIS — D68.59 PROTEIN C DEFICIENCY (HCC): ICD-10-CM

## 2020-07-27 DIAGNOSIS — B00.1 RECURRENT COLD SORES: ICD-10-CM

## 2020-07-27 DIAGNOSIS — N39.0 URINARY TRACT INFECTION WITHOUT HEMATURIA, SITE UNSPECIFIED: ICD-10-CM

## 2020-07-27 DIAGNOSIS — Z15.89 MTHFR MUTATION: ICD-10-CM

## 2020-07-27 DIAGNOSIS — F41.8 ANXIETY ASSOCIATED WITH DEPRESSION: ICD-10-CM

## 2020-07-27 LAB
A-G RATIO,AGRAT: 1.6 RATIO
ALBUMIN SERPL-MCNC: 4.4 G/DL (ref 3.9–5.4)
ALP SERPL-CCNC: 85 U/L (ref 38–126)
ALT SERPL-CCNC: 12 U/L (ref 0–35)
ANION GAP SERPL CALC-SCNC: 11 MMOL/L
AST SERPL W P-5'-P-CCNC: 21 U/L (ref 14–36)
BACTERIA,BACTU: ABNORMAL
BILIRUB SERPL-MCNC: 0.5 MG/DL (ref 0.2–1.3)
BILIRUB UR QL: NEGATIVE
BUN SERPL-MCNC: 18 MG/DL (ref 7–17)
BUN/CREATININE RATIO,BUCR: 26 RATIO
CALCIUM SERPL-MCNC: 10 MG/DL (ref 8.4–10.2)
CHLORIDE SERPL-SCNC: 109 MMOL/L (ref 98–107)
CLARITY: CLEAR
CO2 SERPL-SCNC: 20 MMOL/L (ref 22–32)
COLOR UR: ABNORMAL
CREAT SERPL-MCNC: 0.7 MG/DL (ref 0.7–1.2)
ERYTHROCYTE [DISTWIDTH] IN BLOOD BY AUTOMATED COUNT: 13.5 %
GLOBULIN,GLOB: 2.8
GLUCOSE 24H UR-MRATE: NEGATIVE G/(24.H)
GLUCOSE SERPL-MCNC: 101 MG/DL (ref 65–105)
HCT VFR BLD AUTO: 43.4 % (ref 37–51)
HGB BLD-MCNC: 13.8 G/DL (ref 12–18)
HGB UR QL STRIP: NEGATIVE
KETONES UR QL STRIP.AUTO: NEGATIVE
LEUKOCYTE ESTERASE: ABNORMAL
LYMPHOCYTES ABSOLUTE: 2.7 K/UL (ref 0.6–4.1)
LYMPHOCYTES NFR BLD: 25.7 % (ref 10–58.5)
MCH RBC QN AUTO: 29.6 PG (ref 26–32)
MCHC RBC AUTO-ENTMCNC: 31.8 G/DL (ref 30–36)
MCV RBC AUTO: 93 FL (ref 80–97)
MONOCYTES ABS-DIF,2141: 0.6 K/UL (ref 0–1.8)
MONOCYTES NFR BLD: 5.6 % (ref 0.1–24)
NEUTROPHILS # BLD: 68.7 % (ref 37–92)
NEUTROPHILS ABS,2156: 7.4 K/UL (ref 2–7.8)
NITRITE UR QL STRIP.AUTO: NEGATIVE
PH UR STRIP: 5 [PH] (ref 5–7)
PLATELET # BLD AUTO: 372 K/UL (ref 140–440)
POTASSIUM SERPL-SCNC: 4 MMOL/L (ref 3.6–5)
PROT SERPL-MCNC: 7.2 G/DL (ref 6.3–8.2)
PROT UR STRIP-MCNC: NEGATIVE MG/DL
RBC # BLD AUTO: 4.67 M/UL (ref 4.2–6.3)
RBC #/AREA URNS HPF: ABNORMAL #/HPF
SODIUM SERPL-SCNC: 140 MMOL/L (ref 137–145)
SP GR UR REFRACTOMETRY: 1.02 (ref 1–1.03)
TSH SERPL DL<=0.05 MIU/L-ACNC: 1.11 UIU/ML (ref 0.34–5.6)
UROBILINOGEN UR QL STRIP.AUTO: NEGATIVE
WBC # BLD AUTO: 10.7 K/UL (ref 4.1–10.9)
WBC URNS QL MICRO: ABNORMAL #/HPF

## 2020-07-27 NOTE — PROGRESS NOTES
Subjective: This note will not be viewable in [address] 38 y.o. female for annual Comprehensive personal healthcare examination. Mrs. Jody Price presents to the office today for complete checkup. The patient is a 69-year-old  female who works in [employer] , the mother of 2. Medical issues include anxiety associated with depression for which she has been on Zoloft. There is a family history of this as well. She is tolerating the medication and it is controlling her symptoms without side effect. Overall she has been pleased with the effect. The patient also has a history of protein C deficiency and has had previous blood clots. The patient currently is on aspirin therapy. She has had no signs or symptoms of clotting at this time. The patient also has the MTHFR mutation and is currently on fish oil as well as Virt-Harvey. She has had no history of coronary artery disease. The patient does take acyclovir tablets and uses the external ointment for as needed use for recurrent fever blisters. She notes that this works amazingly well and she has been very happy with the effectiveness of the medication. The patient is up-to-date on Pap testing and mammography and health maintenance issues are up-to-date. Review of systems otherwise negative is noted. History of present illness: This patient has multiple medical problems.   These include:   Patient Active Problem List   Diagnosis Code    Anxiety associated with depression F41.8    Protein C deficiency ([facility] D68.59    Recurrent cold sores B00.1    Fibrocystic breast N60.19    MTHFR mutation ([facility] E72.12          Past Medical History:   Diagnosis Date    Abnormal Pap smear 1993    HPV and colpo wnl fu    Anxiety     Anxiety associated with depression 12/13/2017    Fibrocystic breast     History of mammogram 9/23/15; 9/29/17; 10/1/18    Negative; Negative; Negative (dense)    Lipoma     forehead and shoulder removed in 2008    MTHFR mutation (HCC)     MTHFR mutation ([facility] 12/20/2017    Pap smear for cervical cancer screening 3/24/10; 9/23/15; 10/15/18    neg hpv neg; Negative, HPV negative;  negative/HPV neg    Protein C deficiency ([facility] 12/13/2017    And a prothrombin gene mutation    Recurrent cold sores 12/13/2017     Past Surgical History:   Procedure Laterality Date    HX BREAST LUMPECTOMY  1995    HX COLPOSCOPY  1993    HX OTHER SURGICAL      facial surgery    HX SHOULDER ARTHROSCOPY  2008    lump removed- lipoma    HX TONSILLECTOMY       Allergies   Allergen Reactions    Peters Hives    Penicillins Unknown (comments)     Current Outpatient Medications   Medication Sig Dispense Refill    sertraline (ZOLOFT) 100 mg tablet TAKE 1 TABLET BY MOUTH EVERY DAY 90 Tab 0    Virt-Harvey 2.2-25-1 mg tab TAKE 1 TABLET BY MOUTH DAILY 90 Tab 0    acyclovir (ZOVIRAX) 400 mg tablet TAKE 1 TABLET BY MOUTH FOUR TIMES DAILY 25 Tab 0    acyclovir (ZOVIRAX) 5 % ointment Apply  to affected area every three (3) hours. 2 g 0    multivitamin (ONE A DAY) tablet Take 1 Tab by mouth daily.  VITAMIN E ACETATE (VITAMIN E PO) Take  by mouth.  aspirin delayed-release 81 mg tablet Take  by mouth daily.  CALCIUM CARBONATE (CALCIUM 500 PO) Take  by mouth.  DOCOSAHEXANOIC ACID/EPA (FISH OIL PO) Take  by mouth.        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   Tobacco Use    Smoking status: Never Smoker    Smokeless tobacco: Never Used   Substance and Sexual Activity    Alcohol use: No     Comment: socially    Drug use: No    Sexual activity: Yes     Partners: Male     Birth control/protection: Surgical     Comment: Vasectomy     Family History   Problem Relation Age of Onset    Hypertension Father     High Cholesterol Father     Stroke Paternal Grandmother        Health Maintenance   Topic Date Due    Influenza Age 5 to Adult  08/01/2020    PAP AKA CERVICAL CYTOLOGY  10/15/2023    Lipid Screen  05/03/2024    DTaP/Tdap/Td series (2 - Td) 05/03/2029    Pneumococcal 0-64 years  Aged Out       Review of Systems  Constitutional:  She denies fever, weight loss, sweats or fatigue. HEENT:  No blurred or double vision, headache or dizziness. No difficulty with swallowing, taste, speech or smell. Respiratory:  No cough, wheezing or shortness of breath. No sputum production. Cardiac:  Denies chest pain, palpitations, unexplained indigestion, syncope, edema, PND or orthopnea. GI:  No changes in bowel movements, no abdominal pain, no bloating, anorexia, nausea, vomiting or heartburn. :  No frequency or dysuria. Denies incontinence. Extremities:  No joint pain, stiffness or swelling. Skin:  No recent rashes or mole changes. Neurological:  No numbness, tingling, burning paresthesias or loss of motor strength. No syncope, dizziness, frequent headaches or memory loss. ROS otherwise negative      Objective:     Vitals:    07/27/20 1318   BP: 116/70   Pulse: 61   Resp: 16   Temp: 98.3 °F (36.8 °C)   TempSrc: Oral   SpO2: 97%   Weight: 198 lb 6.4 oz (90 kg)   Height: 5' 5.5\" (1.664 m)   PainSc:   0 - No pain     Body mass index is 32.51 kg/m². Physical Examination:   General Appearance:  Well-developed, well-nourished, no acute distress. Vision:  Deferred to ophthalmologist.       HEENT:    Ears:  The TMs and ear canals were clear. Eyes:  The pupillary responses were normal.  Extraocular muscle function intact. Lids and conjunctiva not injected. No conjunctiva redness or drainage. Pharynx:  Clear with teeth in good repair. No masses were noted. Neck:  Supple without thyromegaly or adenopathy. No JVD noted. No carotid bruits. Lungs:  Clear to auscultation and percussion.   Cardiac:  Regular rate and rhythm without murmur. PMI not displaced. No gallop, rub or click. Abdomen:  Flat, soft, non-tender without palpable organomegaly or mass. No pulsatile mass was felt, and no bruit was heard. Bowel sounds were active. Breasts:  Deferred to GYN  GYN: Deferred to GYN    Rectal exam: Deferred to GYN    Extremities:  No clubbing, cyanosis or edema. Pulses:  Dorsalis pedis and posterior tibial pulses felt without difficulty. Skin:  No rash or unusual mole changes noted. Lymph Nodes:  None felt in the cervical, supraclavicular, axillary or inguinal region. Neurological:  Cranial nerves II-XII grossly intact. Motor strength 5/5. DTRs 2+ and symmetric. Station and gait normal.  Physical exam otherwise negative        Assessment/Plan:     Diagnoses and all orders for this visit:    Routine physical examination  -     COLLECTION VENOUS BLOOD,VENIPUNCTURE  -     CBC WITH AUTOMATED DIFF  -     LIPID PANEL  -     METABOLIC PANEL, COMPREHENSIVE  -     TSH 3RD GENERATION  -     URINALYSIS W/MICROSCOPIC    Protein C deficiency (HCC)    MTHFR mutation (HCC)    Anxiety associated with depression    Recurrent cold sores        Other instructions: The patient's medications were reviewed and reconciled. No change in her current medical regimen will be made. A prudent diet, exercise and weight loss are encouraged. Health maintenance issues were up-to-date. Await results of multiple labs    Follow-up yearly    Follow-up and Dispositions    · Return in about 1 year (around 7/27/2021). Odell Cotto MD     Please note that this dictation was completed with [employer], the computer voice recognition software. Quite often unanticipated grammatical, syntax, homophones, and other interpretive errors are inadvertently transcribed by the computer software. Please disregard these errors. Please excuse any errors that have escaped final proofreading.

## 2020-07-27 NOTE — PATIENT INSTRUCTIONS
Learning About Generalized Anxiety Disorder What is generalized anxiety disorder? We all worry. It's a normal part of life. But when you have generalized anxiety disorder, you worry about lots of things and have a hard time stopping your worry. This worry or anxiety interferes with your relationships, work, and life. What causes it? The cause is not known. But it may be passed down through families. What are the symptoms? You may feel anxious or worry most days about things like work, relationships, health, or money. You may worry about things that are unlikely to happen. You find it hard to stop or control the worry. Because you worry a lot and try hard to stop worrying, you may feel restless, tired, tense, or cranky. You may also find it hard to think or sleep. And you may have headaches or an upset stomach. How is it diagnosed? Your doctor will ask about your health and how often you worry or feel anxious. He or she may ask about other symptoms, like whether you: · Feel restless. · Feel tired. · Have a hard time thinking or feel that your mind goes blank. · Feel cranky. · Have tense muscles. · Have sleep problems. A physical exam and tests can help make sure that your symptoms aren't caused by a different condition, such as a thyroid problem. How is it treated? Counseling and medicine can both work to treat anxiety. The two are often used along with lifestyle changes. Cognitive-behavioral therapy (CBT) is a type of counseling that's used to help treat anxiety. In CBT, you learn how to notice and replace thoughts that make you feel worried. It also can help you learn how to relax when you worry. Medicines can help. These medicines are often also used for depression. Selective serotonin reuptake inhibitors (SSRIs) are often tried first. But there are other medicines that your doctor may use. You may need to try a few medicines to find one that works well. Many people feel better by getting regular exercise, eating healthy meals, and getting good sleep. Mindfulnessfocusing on things that happen in the present momentalso can help reduce your anxiety. What can you expect when you have it? Having anxiety can be upsetting. Some people might feel less worried and stressed after a couple of months of treatment. But for other people, it might take longer to feel better. Reaching out to people for help is important. Try not to isolate yourself. Let your family and friends help you. Find someone you can trust and confide in. Talk to that person. When you know what anxiety isand how you can get help for ityou can start to learn new ways of thinking. This can help you cope and work through your anxiety. Follow-up care is a key part of your treatment and safety. Be sure to make and go to all appointments, and call your doctor if you are having problems. It's also a good idea to know your test results and keep a list of the medicines you take. Where can you learn more? Go to http://mariusz-sim.info/ Enter G110 in the search box to learn more about \"Learning About Generalized Anxiety Disorder. \" Current as of: January 31, 2020               Content Version: 12.5 © 4904-8280 [employer]. Care instructions adapted under license by [employer] (which disclaims liability or warranty for this information). If you have questions about a medical condition or this instruction, always ask your healthcare professional. [address] any warranty or liability for your use of this information.

## 2020-07-27 NOTE — PROGRESS NOTES
Janan Krabbe is a 38 y.o. female presenting for Complete Physical  .     1. Have you been to the ER, urgent care clinic since your last visit? Hospitalized since your last visit? No    2. Have you seen or consulted any other health care providers outside of the [address] [place] [address] since your last visit? Include any pap smears or colon screening. No    No flowsheet data found. No flowsheet data found. 3 most recent PHQ Screens 7/27/2020   Little interest or pleasure in doing things Not at all   Feeling down, depressed, irritable, or hopeless Not at all   Total Score PHQ 2 0   Trouble falling or staying asleep, or sleeping too much Not at all   Feeling tired or having little energy Not at all   Poor appetite, weight loss, or overeating Not at all   Feeling bad about yourself - or that you are a failure or have let yourself or your family down Not at all   Trouble concentrating on things such as school, work, reading, or watching TV Not at all   Moving or speaking so slowly that other people could have noticed; or the opposite being so fidgety that others notice Not at all   Thoughts of being better off dead, or hurting yourself in some way Not at all   PHQ 9 Score 0   How difficult have these problems made it for you to do your work, take care of your home and get along with others Not difficult at all       There are no discontinued medications.

## 2020-07-28 LAB
CHOLEST SERPL-MCNC: 223 MG/DL (ref 100–199)
HDLC SERPL-MCNC: 59 MG/DL
LDLC SERPL CALC-MCNC: 134 MG/DL (ref 0–99)
TRIGL SERPL-MCNC: 152 MG/DL (ref 0–149)
VLDLC SERPL CALC-MCNC: 30 MG/DL (ref 5–40)

## 2020-07-29 LAB — BACTERIA UR CULT: NO GROWTH

## 2020-07-29 NOTE — PROGRESS NOTES
Please notify patient. Labs stable.   LDL cholesterol remains slightly elevated at 134  No change in current management/meds

## 2020-08-28 RX ORDER — CYANOCOBALAMIN/FOLIC AC/VIT B6 1-2.2-25MG
TABLET ORAL
Qty: 90 TAB | Refills: 0 | Status: SHIPPED | OUTPATIENT
Start: 2020-08-28 | End: 2021-01-10

## 2020-12-07 RX ORDER — ACYCLOVIR 400 MG/1
TABLET ORAL
Qty: 25 TAB | Refills: 0 | Status: SHIPPED | OUTPATIENT
Start: 2020-12-07 | End: 2021-01-10

## 2021-01-10 RX ORDER — CYANOCOBALAMIN/FOLIC AC/VIT B6 1-2.2-25MG
TABLET ORAL
Qty: 90 TAB | Refills: 0 | Status: SHIPPED | OUTPATIENT
Start: 2021-01-10 | End: 2021-04-14

## 2021-01-10 RX ORDER — ACYCLOVIR 400 MG/1
TABLET ORAL
Qty: 25 TAB | Refills: 0 | Status: SHIPPED | OUTPATIENT
Start: 2021-01-10 | End: 2022-01-19

## 2021-01-19 NOTE — PATIENT INSTRUCTIONS
Well Visit, Ages 25 to 48: Care Instructions Your Care Instructions Physical exams can help you stay healthy. Your doctor has checked your overall health and may have suggested ways to take good care of yourself. He or she also may have recommended tests. At home, you can help prevent illness with healthy eating, regular exercise, and other steps. Follow-up care is a key part of your treatment and safety. Be sure to make and go to all appointments, and call your doctor if you are having problems. It's also a good idea to know your test results and keep a list of the medicines you take. How can you care for yourself at home? · Reach and stay at a healthy weight. This will lower your risk for many problems, such as obesity, diabetes, heart disease, and high blood pressure. · Get at least 30 minutes of physical activity on most days of the week. Walking is a good choice. You also may want to do other activities, such as running, swimming, cycling, or playing tennis or team sports. Discuss any changes in your exercise program with your doctor. · Do not smoke or allow others to smoke around you. If you need help quitting, talk to your doctor about stop-smoking programs and medicines. These can increase your chances of quitting for good. · Talk to your doctor about whether you have any risk factors for sexually transmitted infections (STIs). Having one sex partner (who does not have STIs and does not have sex with anyone else) is a good way to avoid these infections. · Use birth control if you do not want to have children at this time. Talk with your doctor about the choices available and what might be best for you. · Protect your skin from too much sun. When you're outdoors from 10 a.m. to 4 p.m., stay in the shade or cover up with clothing and a hat with a wide brim. Wear sunglasses that block UV rays. Even when it's cloudy, put broad-spectrum sunscreen (SPF 30 or higher) on any exposed skin. · See a dentist one or two times a year for checkups and to have your teeth cleaned. · Wear a seat belt in the car. Follow your doctor's advice about when to have certain tests. These tests can spot problems early. For everyone · Cholesterol. Have the fat (cholesterol) in your blood tested after age 21. Your doctor will tell you how often to have this done based on your age, family history, or other things that can increase your risk for heart disease. · Blood pressure. Have your blood pressure checked during a routine doctor visit. Your doctor will tell you how often to check your blood pressure based on your age, your blood pressure results, and other factors. · Vision. Talk with your doctor about how often to have a glaucoma test. 
· Diabetes. Ask your doctor whether you should have tests for diabetes. · Colon cancer. Your risk for colorectal cancer gets higher as you get older. Some experts say that adults should start regular screening at age 48 and stop at age 76. Others say to start before age 48 or continue after age 76. Talk with your doctor about your risk and when to start and stop screening. For women · Breast exam and mammogram. Talk to your doctor about when you should have a clinical breast exam and a mammogram. Medical experts differ on whether and how often women under 50 should have these tests. Your doctor can help you decide what is right for you. · Cervical cancer screening test and pelvic exam. Begin with a Pap test at age 24. The test often is part of a pelvic exam. Starting at age 27, you may choose to have a Pap test, an HPV test, or both tests at the same time (called co-testing). Talk with your doctor about how often to have testing. · Tests for sexually transmitted infections (STIs). Ask whether you should have tests for STIs. You may be at risk if you have sex with more than one person, especially if your partners do not wear condoms. For men · Tests for sexually transmitted infections (STIs). Ask whether you should have tests for STIs. You may be at risk if you have sex with more than one person, especially if you do not wear a condom. · Testicular cancer exam. Ask your doctor whether you should check your testicles regularly. · Prostate exam. Talk to your doctor about whether you should have a blood test (called a PSA test) for prostate cancer. Experts differ on whether and when men should have this test. Some experts suggest it if you are older than 39 and are -American or have a father or brother who got prostate cancer when he was younger than 72. When should you call for help? Watch closely for changes in your health, and be sure to contact your doctor if you have any problems or symptoms that concern you. Where can you learn more? Go to http://www.johnson.com/ Enter P072 in the search box to learn more about \"Well Visit, Ages 25 to 48: Care Instructions. \" Current as of: May 27, 2020               Content Version: 12.6 © 2006-2020 [employer]. Care instructions adapted under license by [employer] (which disclaims liability or warranty for this information). If you have questions about a medical condition or this instruction, always ask your healthcare professional. [address] any warranty or liability for your use of this information.

## 2021-01-20 ENCOUNTER — OFFICE VISIT (OUTPATIENT)
Dept: OBGYN CLINIC | Age: 46
End: 2021-01-20
Payer: COMMERCIAL

## 2021-01-20 VITALS
SYSTOLIC BLOOD PRESSURE: 109 MMHG | HEART RATE: 64 BPM | WEIGHT: 201.4 LBS | BODY MASS INDEX: 32.37 KG/M2 | HEIGHT: 66 IN | DIASTOLIC BLOOD PRESSURE: 73 MMHG

## 2021-01-20 DIAGNOSIS — Z01.419 WELL FEMALE EXAM WITH ROUTINE GYNECOLOGICAL EXAM: Primary | ICD-10-CM

## 2021-01-20 DIAGNOSIS — Z12.4 CERVICAL CANCER SCREENING: ICD-10-CM

## 2021-01-20 PROCEDURE — 99396 PREV VISIT EST AGE 40-64: CPT | Performed by: OBSTETRICS & GYNECOLOGY

## 2021-01-20 NOTE — PROGRESS NOTES
[facility]  [email or web address]  [phone]    Ruth Lyons MD, [address] [facility]       Annual Gynecologic Exam  WWE 40-60  Chief Complaint   Patient presents with    Well Woman       Doyle Randle is a 54 y.o.  [facility]  female who presents for an annual exam.  Patient's last menstrual period was 2021 (exact date). .    She does not report additional concerns today. Menstrual status:  Her periods are light, moderate, heavy, with cramping, regular cycles. She does not report dysmenorrhea/painful menses. She does not report irregular bleeding. Sexual history and Contraception:  Social History     Substance and Sexual Activity   Sexual Activity Yes    Partners: Male    Birth control/protection: Surgical    Comment: Vasectomy       She does not reports new sexual partner(s) in the last year. The patient does not request STD testing. Preventive Medicine History:  Her most recent Pap smear result: normal was obtained in 2018    Her most recent HR HPV screen was Negative obtained in     She does not have a history of DEDE 2, 3 or cervical cancer. Breast health:  Last mammogram: approximate date 10/2018 and was normal.   A mammogram was scheduled for today. Breast cancer family updated: see FH. Bone health: Glennlucina Zachary She does not have a history of osteopenia/osteoporosis. Osteoporosis family history updated: see .      Past Medical History:   Diagnosis Date    Abnormal Pap smear     HPV and colpo wnl fu    Anxiety     Anxiety associated with depression 2017    Fibrocystic breast     History of mammogram 9/23/15; 17; 10/1/18    Negative; Negative; Negative (dense)    Lipoma     forehead and shoulder removed in     [facility] mutation [facility] [address]     MTHFR mutation ([facility] [address] 2017    Pap smear for cervical cancer screening 3/24/10; 9/23/15; 10/15/18    neg hpv neg; Negative, HPV negative;  negative/HPV neg    Protein C deficiency ([facility] [address] 2017    And a prothrombin gene mutation    Recurrent cold sores 2017     OB History    Para Term  AB Living   4 2 2 0 2 2   SAB TAB Ectopic Molar Multiple Live Births   2 0 0   0        # Outcome Date GA Lbr Ruiz/2nd Weight Sex Delivery Anes PTL Lv   4 SAB            3 SAB            2 Term            1 Term                Past Surgical History:   Procedure Laterality Date    HX BREAST LUMPECTOMY      HX COLPOSCOPY      HX OTHER SURGICAL      facial surgery    HX SHOULDER ARTHROSCOPY      lump removed- lipoma    HX TONSILLECTOMY       Family History   Problem Relation Age of Onset    Hypertension Father     High Cholesterol Father     Stroke Paternal Grandmother      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   Social Needs    Financial resource strain: Not on file    Food insecurity     Worry: Not on file     Inability: Not on file   [employer] needs     Medical: Not on file     Non-medical: Not on file   Tobacco Use    Smoking status: Never Smoker    Smokeless tobacco: Never Used   Substance and Sexual Activity    Alcohol use: Yes     Comment: socially    Drug use: No    Sexual activity: Yes     Partners: Male     Birth control/protection: Surgical     Comment: Vasectomy   Lifestyle    Physical activity     Days per week: Not on file     Minutes per session: Not on file    Stress: Not on file   Relationships    Social connections     Talks on phone: Not on file     Gets together: Not on file     Attends Quaker service: Not on file     Active member of club or organization: Not on file     Attends meetings of clubs or organizations: Not on file     Relationship status: Not on file    Intimate partner violence     Fear of current or ex partner: Not on file     Emotionally abused: Not on file     Physically abused: Not on file     Forced sexual activity: Not on file   Other Topics Concern    Not on file   Social History Narrative    Not on file       Allergies   Allergen Reactions    Aamir Hives    Penicillins Unknown (comments)       Current Outpatient Medications   Medication Sig    Virt-Harvey 2.2-25-1 mg tab TAKE 1 TABLET BY MOUTH DAILY    acyclovir (ZOVIRAX) 400 mg tablet TAKE 1 TABLET BY MOUTH FOUR TIMES DAILY    sertraline (ZOLOFT) 100 mg tablet TAKE 1 TABLET BY MOUTH EVERY DAY    acyclovir (ZOVIRAX) 5 % ointment Apply  to affected area every three (3) hours.  multivitamin (ONE A DAY) tablet Take 1 Tab by mouth daily.  VITAMIN E ACETATE (VITAMIN E PO) Take  by mouth.  aspirin delayed-release 81 mg tablet Take  by mouth daily.  CALCIUM CARBONATE (CALCIUM 500 PO) Take  by mouth.  DOCOSAHEXANOIC ACID/EPA (FISH OIL PO) Take  by mouth. No current facility-administered medications for this visit.         Patient Active Problem List   Diagnosis Code    Anxiety associated with depression F41.8    Protein C deficiency ([facility] [address] D68.59    Recurrent cold sores B00.1    Fibrocystic breast N60.19    MTHFR mutation ([facility] [address] E72.12       Review of Systems - History obtained from the patient  Constitutional/general, HEENT, CV, Resp, GI, MSK, Neuro, Psych, Heme/lymph, Skin, Breast ROS: no significant complaints except as noted on HPI       Physical Exam  Visit Vitals  /73 (BP 1 Location: Right arm, BP Patient Position: Sitting)   Pulse 64   Ht 5' 5.5\" (1.664 m)   Wt 201 lb 6.4 oz (91.4 kg)   LMP 01/01/2021 (Exact Date)   BMI 33.00 kg/m²       Constitutional  · Appearance: well-nourished, well developed, alert, in no acute distress    HENT  · Head and Face: appears normal    Neck  · Inspection/Palpation: normal appearance, no masses or tenderness  · Lymph Nodes: no lymphadenopathy present  · Thyroid: gland size normal, nontender, no nodules or masses present on palpation    Chest  · Respiratory Effort: breathing unlabored  · Auscultation: normal breath sounds    Cardiovascular  · Heart:  · Auscultation: regular rate and rhythm without murmur    Breasts  · Inspection of Breasts: breasts symmetrical, no skin changes, no discharge present, nipple appearance normal, no skin retraction present  · Palpation of Breasts and Axillae: no masses present on palpation, no breast tenderness  · Axillary Lymph Nodes: no lymphadenopathy present    Gastrointestinal  · Abdominal Examination: abdomen non-tender to palpation, normal bowel sounds, no masses present  · Liver and spleen: no hepatomegaly present, spleen not palpable  · Hernias: no hernias identified    Genitourinary  · External Genitalia: normal appearance for age, no discharge present, no tenderness present, no inflammatory lesions present, no masses present, without atrophy present  · Vagina: normal vaginal vault without central or paravaginal defects, no discharge present, no inflammatory lesions present, no masses present  · Bladder: non-tender to palpation  · Urethra: appears normal  · Cervix: normal   · Uterus: normal size, shape and consistency  · Adnexa: no adnexal tenderness present, no adnexal masses present  · Perineum: perineum within normal limits, no evidence of trauma, no rashes or skin lesions present  · Anus: anus within normal limits, no hemorrhoids present  · Inguinal Lymph Nodes: no lymphadenopathy present    Skin  · General Inspection: no rash, no lesions identified    Neurologic/Psychiatric  · Mental Status:  · Orientation: grossly oriented to person, place and time  · Mood and Affect: mood normal, affect appropriate    Assessment:  54 y.o.  for well woman exam  Encounter Diagnoses   Name Primary?     Well female exam with routine gynecological exam Yes    Cervical cancer screening        Plan:  The patient was counseled about healthy lifestyle, disease prevention, and bone protection. We discussed current self breast exam and mammogram recommendations  We discussed current pap smear and HR HPV testing guidelines  We recommend follow up in one year for routine annual gynecologic exam or sooner if needed  Handouts were given to the patient  We recommend follow up with a primary care physician for chronic medical problems and evaluation of non-gynecologic concerns and to please contact our office with any GYN questions or concerns. Folllow up:  [x] return for annual well woman exam in one year or sooner if she is having problems  [] follow up and ultrasound  [x] mammogram  [] 6 months  [] 3 months  [] 1 month    Orders Placed This Encounter    PAP IG, APTIMA HPV AND RFX 05/82,64 ([id number])       Results for orders placed or performed during the hospital encounter of 01/20/21   YESSICA 3D JOSELUIS W MAMMO BI SCREENING INCL CAD    Narrative    STUDY: Bilateral digital screening mammogram with 3-D tomosynthesis    INDICATION:  Screening. COMPARISON: Most recent 2018    BREAST COMPOSITION: The breasts are heterogeneously dense, which may obscure  small masses. FINDINGS: Bilateral digital screening mammography was performed and is  interpreted in conjunction with a computer assisted detection (CAD) system. Additionally, tomosynthesis of both breasts in the CC and MLO projections was  performed. No suspicious masses or calcifications are identified. There has been  no significant change. Impression    BI-RADS 1: Negative. No mammographic evidence of malignancy. RECOMMENDATIONS:  Next screening mammogram is recommended in one year. The patient will be notified of these results. In accordance with [place] legislation enacted July 2012 (32.1-229 of the Code  of [place]), we have informed this patient by letter that she may have dense  breast tissue. Dense breast tissue can hide cancer or other abnormalities.  We  have instructed her to contact her referring physician if she has any questions  or concerns about this report. There are many factors that can increase a  woman's risk of developing breast cancer, including a family history of breast  cancer. A woman's lifetime risk of developing breast cancer can be estimated  using the Breast Cancer Risk Assessment Tool, found on the [employer] website (www.cancer.gov/bcrisktool/). The addition of breast MRI as a  screening tool may be useful for women with lifetime risk assessments greater  than 20%.

## 2021-01-30 LAB
CYTOLOGIST CVX/VAG CYTO: NORMAL
CYTOLOGY CVX/VAG DOC CYTO: NORMAL
CYTOLOGY CVX/VAG DOC THIN PREP: NORMAL
CYTOLOGY HISTORY:: NORMAL
DX ICD CODE: NORMAL
HPV I/H RISK 4 DNA CVX QL PROBE+SIG AMP: NEGATIVE
Lab: NORMAL
OTHER STN SPEC: NORMAL
STAT OF ADQ CVX/VAG CYTO-IMP: NORMAL

## 2021-01-30 NOTE — PROGRESS NOTES
Normal pap smear, message sent if [facility] active. Update per TP protocol. Update PMH/HM: include:  Date of pap, Cytology: wnl. For HR HPV results: list NEG or POS, when done.

## 2021-04-14 RX ORDER — CYANOCOBALAMIN/FOLIC AC/VIT B6 1-2.2-25MG
TABLET ORAL
Qty: 90 TAB | Refills: 0 | Status: SHIPPED | OUTPATIENT
Start: 2021-04-14 | End: 2021-08-21

## 2021-07-27 ENCOUNTER — OFFICE VISIT (OUTPATIENT)
Dept: INTERNAL MEDICINE CLINIC | Age: 46
End: 2021-07-27
Payer: COMMERCIAL

## 2021-07-27 VITALS
SYSTOLIC BLOOD PRESSURE: 120 MMHG | OXYGEN SATURATION: 99 % | HEIGHT: 66 IN | RESPIRATION RATE: 18 BRPM | HEART RATE: 58 BPM | WEIGHT: 198.6 LBS | DIASTOLIC BLOOD PRESSURE: 78 MMHG | TEMPERATURE: 98.3 F | BODY MASS INDEX: 31.92 KG/M2

## 2021-07-27 DIAGNOSIS — Z15.89 MTHFR MUTATION: ICD-10-CM

## 2021-07-27 DIAGNOSIS — Z00.00 ROUTINE PHYSICAL EXAMINATION: Primary | ICD-10-CM

## 2021-07-27 DIAGNOSIS — Z11.59 NEED FOR HEPATITIS C SCREENING TEST: ICD-10-CM

## 2021-07-27 DIAGNOSIS — D68.59 PROTEIN C DEFICIENCY (HCC): ICD-10-CM

## 2021-07-27 DIAGNOSIS — F41.8 ANXIETY ASSOCIATED WITH DEPRESSION: ICD-10-CM

## 2021-07-27 PROCEDURE — 99396 PREV VISIT EST AGE 40-64: CPT | Performed by: INTERNAL MEDICINE

## 2021-07-27 NOTE — PROGRESS NOTES
Eileen Smith is a 54 y.o. female presenting for Complete Physical  .     1. Have you been to the ER, urgent care clinic since your last visit? Hospitalized since your last visit? No    2. Have you seen or consulted any other health care providers outside of the [address] [place] [address] since your last visit? Include any pap smears or colon screening. No    No flowsheet data found. No flowsheet data found. 3 most recent PHQ Screens 7/27/2021   Little interest or pleasure in doing things Not at all   Feeling down, depressed, irritable, or hopeless Not at all   Total Score PHQ 2 0   Trouble falling or staying asleep, or sleeping too much Not at all   Feeling tired or having little energy Not at all   Poor appetite, weight loss, or overeating Not at all   Feeling bad about yourself - or that you are a failure or have let yourself or your family down Not at all   Trouble concentrating on things such as school, work, reading, or watching TV Not at all   Moving or speaking so slowly that other people could have noticed; or the opposite being so fidgety that others notice Not at all   Thoughts of being better off dead, or hurting yourself in some way Not at all   PHQ 9 Score 0   How difficult have these problems made it for you to do your work, take care of your home and get along with others Not difficult at all       There are no discontinued medications.

## 2021-07-27 NOTE — PROGRESS NOTES
Subjective:     54 y.o. female for annual Comprehensive personal healthcare examination. History of present illness: This patient has multiple medical problems. These include:     Mrs. Indigo Espino presents the office today for complete checkup. She is a 54-year-old  female, mother of 2, a .  Medical problems include    The patient has a history of anxiety/depression for which she is on Zoloft. The medication continues to work effectively for her without side effect. She has had no breakthrough symptoms and offers no new complaints. She has a history of MTHFR mutation as well as protein C deficiency. She does take fish oil and a folic acid supplement for this. She has had no history of blood clots or cardiac disease. She has a history of recurrent fever blisters for which she either uses topical or oral acyclovir. This continues to work well for her in regards to aborting these attacks. The patient is otherwise been in good health and is up-to-date on all health maintenance issues.     Patient Active Problem List   Diagnosis Code    Anxiety associated with depression F41.8    Protein C deficiency ([facility] [address] D68.59    Recurrent cold sores B00.1    Fibrocystic breast N60.19    MTHFR mutation Z15.89      Past Medical History:   Diagnosis Date    Abnormal Pap smear 1993    HPV and colpo wnl fu    Anxiety     Anxiety associated with depression 12/13/2017    Fibrocystic breast     History of mammogram 1/20/21, 9/23/15; 9/29/17; 10/1/18    BI-RADS 1: Negative dense, Negative; Negative; Negative (dense)    Lipoma     forehead and shoulder removed in 2008    MTHFR mutation     MTHFR mutation 12/20/2017    Pap smear for cervical cancer screening 1/20/21, 3/24/10; 9/23/15; 10/15/18    neg hpv neg; Negative, HPV negative;  negative/HPV neg    Protein C deficiency ([address] 12/13/2017    And a prothrombin gene mutation    Recurrent cold sores 12/13/2017     Past Surgical History: Procedure Laterality Date    HX BREAST LUMPECTOMY  1995    HX COLPOSCOPY  1993    HX OTHER SURGICAL      facial surgery    HX SHOULDER ARTHROSCOPY  2008    lump removed- lipoma    HX TONSILLECTOMY       Allergies   Allergen Reactions    Aamir Hives    Penicillins Unknown (comments)     Current Outpatient Medications   Medication Sig Dispense Refill    Virt-Harvey 2.2-25-1 mg tab TAKE 1 TABLET BY MOUTH DAILY 90 Tab 0    acyclovir (ZOVIRAX) 400 mg tablet TAKE 1 TABLET BY MOUTH FOUR TIMES DAILY 25 Tab 0    sertraline (ZOLOFT) 100 mg tablet TAKE 1 TABLET BY MOUTH EVERY DAY 90 Tab 2    acyclovir (ZOVIRAX) 5 % ointment Apply  to affected area every three (3) hours. 2 g 0    multivitamin (ONE A DAY) tablet Take 1 Tab by mouth daily.  VITAMIN E ACETATE (VITAMIN E PO) Take  by mouth.  aspirin delayed-release 81 mg tablet Take  by mouth daily.  CALCIUM CARBONATE (CALCIUM 500 PO) Take  by mouth.  DOCOSAHEXANOIC ACID/EPA (FISH OIL PO) Take  by mouth. Social History     Socioeconomic History    Marital status:      Spouse name: Not on file    Number of children: 2    Years of education: Not on file    Highest education level: Not on file   Occupational History    Occupation:    Tobacco Use    Smoking status: Never Smoker    Smokeless tobacco: Never Used   Vaping Use    Vaping Use: Never used   Substance and Sexual Activity    Alcohol use: Yes     Comment: socially    Drug use: No    Sexual activity: Yes     Partners: Male     Birth control/protection: Surgical     Comment: Vasectomy     Social Determinants of Health     Financial Resource Strain:     Difficulty of Paying Living Expenses:    Food Insecurity:     Worried About Running Out of Food in the Last Year:     [address] in the Last Year:    Transportation Needs:     Lack of Transportation (Medical):      Lack of Transportation (Non-Medical):    Physical Activity:     Days of Exercise per Week:     Minutes of Exercise per Session:    Stress:     Feeling of Stress :    Social Connections:     Frequency of Communication with Friends and Family:     Frequency of Social Gatherings with Friends and Family:     Attends [employer] Services:     Active Member of Clubs or Organizations:     Attends Club or Organization Meetings:     Marital Status:      Family History   Problem Relation Age of Onset    Hypertension Father     High Cholesterol Father     Stroke Paternal Grandmother        Health Maintenance   Topic Date Due    Hepatitis C Screening  Never done    Colorectal Cancer Screening Combo  Never done    Flu Vaccine (1) 09/01/2021    Breast Cancer Screen Mammogram  01/20/2023    Lipid Screen  07/27/2025    PAP AKA CERVICAL CYTOLOGY  01/20/2026    DTaP/Tdap/Td series (2 - Td or Tdap) 05/03/2029    COVID-19 Vaccine  Completed    Pneumococcal 0-64 years  Aged Out       Review of Systems  Constitutional:  She denies fever, weight loss, sweats or fatigue. HEENT:  No blurred or double vision, headache or dizziness. No difficulty with swallowing, taste, speech or smell. Respiratory:  No cough, wheezing or shortness of breath. No sputum production. Cardiac:  Denies chest pain, palpitations, unexplained indigestion, syncope, edema, PND or orthopnea. GI:  No changes in bowel movements, no abdominal pain, no bloating, anorexia, nausea, vomiting or heartburn. :  No frequency or dysuria. Denies incontinence. Extremities:  No joint pain, stiffness or swelling. Skin:  No recent rashes or mole changes. Neurological:  No numbness, tingling, burning paresthesias or loss of motor strength. No syncope, dizziness, frequent headaches or memory loss.   ROS otherwise negative      Objective:     Vitals:    07/27/21 1317   BP: 120/78   Pulse: (!) 58   Resp: 18   Temp: 98.3 °F (36.8 °C)   TempSrc: Oral   SpO2: 99%   Weight: 198 lb 9.6 oz (90.1 kg)   Height: 5' 5.5\" (1.664 m)   PainSc:   0 - No pain     Body mass index is 32.55 kg/m². Physical Examination:   General Appearance:  Well-developed, well-nourished, no acute distress. Vision:  Deferred to ophthalmologist.       HEENT:    Ears:  The TMs and ear canals were clear. Eyes:  The pupillary responses were normal.  Extraocular muscle function intact. Lids and conjunctiva not injected. No conjunctiva redness or drainage. Pharynx:  Clear with teeth in good repair. No masses were noted. Neck:  Supple without thyromegaly or adenopathy. No JVD noted. No carotid bruits. Lungs:  Clear to auscultation and percussion. Cardiac:  Regular rate and rhythm without murmur. PMI not displaced. No gallop, rub or click. Abdomen:  Flat, soft, non-tender without palpable organomegaly or mass. No pulsatile mass was felt, and no bruit was heard. Bowel sounds were active. Breasts:  Deferred to GYN  GYN: Deferred to GYN    Rectal exam: Deferred to GYN    Extremities:  No clubbing, cyanosis or edema. Pulses:  Dorsalis pedis and posterior tibial pulses felt without difficulty. Skin:  No rash or unusual mole changes noted. Lymph Nodes:  None felt in the cervical, supraclavicular, axillary or inguinal region. Neurological:  Cranial nerves II-XII grossly intact. Motor strength 5/5. DTRs 2+ and symmetric. Station and gait normal.  Physical exam otherwise negative        Assessment/Plan:     Diagnoses and all orders for this visit:    Routine physical examination  -     COLLECTION VENOUS BLOOD,VENIPUNCTURE  -     CBC WITH AUTOMATED DIFF; Future  -     HEPATITIS C AB; Future  -     LIPID PANEL; Future  -     METABOLIC PANEL, COMPREHENSIVE; Future  -     TSH 3RD GENERATION; Future  -     URINALYSIS W/ REFLEX CULTURE; Future    Anxiety associated with depression    Protein C deficiency (HCC)    MTHFR mutation    Need for hepatitis C screening test  -     HEPATITIS C AB; Future        Other instructions:    The patient's medications were reviewed and reconciled. No change in her current medical regimen will be made. A prudent diet and exercise is encouraged    Weight loss recommended with body mass index of 33    Health maintenance issues are reviewed and colorectal cancer screening has recently been recommended for those over the age of 39 however she will need to check with her insurance in regards to coverage of screening colonoscopies at this age. CDC recommended hepatitis C screening will be obtained    Await results of multiple labs    Follow-up yearly    Follow-up and Dispositions    · Return in about 1 year (around 7/27/2022). Ray Rock MD     Please note that this dictation was completed with "[employer] the [employer] voice recognition software. Quite often unanticipated grammatical, syntax, homophones, and other interpretive errors are inadvertently transcribed by the computer software. Please disregard these errors. Please excuse any errors that have escaped final proofreading.

## 2021-07-27 NOTE — PATIENT INSTRUCTIONS
DASH Diet: Care Instructions  Your Care Instructions     The DASH diet is an eating plan that can help lower your blood pressure. DASH stands for Dietary Approaches to Stop Hypertension. Hypertension is high blood pressure. The DASH diet focuses on eating foods that are high in calcium, potassium, and magnesium. These nutrients can lower blood pressure. The foods that are highest in these nutrients are fruits, vegetables, low-fat dairy products, nuts, seeds, and legumes. But taking calcium, potassium, and magnesium supplements instead of eating foods that are high in those nutrients does not have the same effect. The DASH diet also includes whole grains, fish, and poultry. The DASH diet is one of several lifestyle changes your doctor may recommend to lower your high blood pressure. Your doctor may also want you to decrease the amount of sodium in your diet. Lowering sodium while following the DASH diet can lower blood pressure even further than just the DASH diet alone. Follow-up care is a key part of your treatment and safety. Be sure to make and go to all appointments, and call your doctor if you are having problems. It's also a good idea to know your test results and keep a list of the medicines you take. How can you care for yourself at home? Following the DASH diet  · Eat 4 to 5 servings of fruit each day. A serving is 1 medium-sized piece of fruit, ½ cup chopped or canned fruit, 1/4 cup dried fruit, or 4 ounces (½ cup) of fruit juice. Choose fruit more often than fruit juice. · Eat 4 to 5 servings of vegetables each day. A serving is 1 cup of lettuce or raw leafy vegetables, ½ cup of chopped or cooked vegetables, or 4 ounces (½ cup) of vegetable juice. Choose vegetables more often than vegetable juice. · Get 2 to 3 servings of low-fat and fat-free dairy each day. A serving is 8 ounces of milk, 1 cup of yogurt, or 1 ½ ounces of cheese. · Eat 6 to 8 servings of grains each day.  A serving is 1 slice of bread, 1 ounce of dry cereal, or ½ cup of cooked rice, pasta, or cooked cereal. Try to choose whole-grain products as much as possible. · Limit lean meat, poultry, and fish to 2 servings each day. A serving is 3 ounces, about the size of a deck of cards. · Eat 4 to 5 servings of nuts, seeds, and legumes (cooked dried beans, lentils, and split peas) each week. A serving is 1/3 cup of nuts, 2 tablespoons of seeds, or ½ cup of cooked beans or peas. · Limit fats and oils to 2 to 3 servings each day. A serving is 1 teaspoon of vegetable oil or 2 tablespoons of salad dressing. · Limit sweets and added sugars to 5 servings or less a week. A serving is 1 tablespoon jelly or jam, ½ cup sorbet, or 1 cup of lemonade. · Eat less than 2,300 milligrams (mg) of sodium a day. If you limit your sodium to 1,500 mg a day, you can lower your blood pressure even more. · Be aware that all of these are the suggested number of servings for people who eat 1,800 to 2,000 calories a day. Your recommended number of servings may be different if you need more or fewer calories. Tips for success  · Start small. Do not try to make dramatic changes to your diet all at once. You might feel that you are missing out on your favorite foods and then be more likely to not follow the plan. Make small changes, and stick with them. Once those changes become habit, add a few more changes. · Try some of the following:  ? Make it a goal to eat a fruit or vegetable at every meal and at snacks. This will make it easy to get the recommended amount of fruits and vegetables each day. ? Try yogurt topped with fruit and nuts for a snack or healthy dessert. ? Add lettuce, tomato, cucumber, and onion to sandwiches. ? Combine a ready-made pizza crust with low-fat mozzarella cheese and lots of vegetable toppings. Try using tomatoes, squash, spinach, broccoli, carrots, cauliflower, and onions. ?  Have a variety of cut-up vegetables with a low-fat dip as an appetizer instead of chips and dip. ? Sprinkle sunflower seeds or chopped almonds over salads. Or try adding chopped walnuts or almonds to cooked vegetables. ? Try some vegetarian meals using beans and peas. Add garbanzo or kidney beans to salads. Make burritos and tacos with mashed torres beans or black beans. Where can you learn more? Go to http://www.johnson.com/  Enter H967 in the search box to learn more about \"DASH Diet: Care Instructions. \"  Current as of: August 31, 2020               Content Version: 12.8  © 1379-9082 [employer]. Care instructions adapted under license by [employer] (which disclaims liability or warranty for this information). If you have questions about a medical condition or this instruction, always ask your healthcare professional. [address] any warranty or liability for your use of this information.

## 2021-07-28 ENCOUNTER — TELEPHONE (OUTPATIENT)
Dept: INTERNAL MEDICINE CLINIC | Age: 46
End: 2021-07-28

## 2021-07-28 LAB
ALBUMIN SERPL-MCNC: 4 G/DL (ref 3.5–5)
ALBUMIN/GLOB SERPL: 1.3 {RATIO} (ref 1.1–2.2)
ALP SERPL-CCNC: 90 U/L (ref 45–117)
ALT SERPL-CCNC: 20 U/L (ref 12–78)
ANION GAP SERPL CALC-SCNC: 6 MMOL/L (ref 5–15)
APPEARANCE UR: CLEAR
AST SERPL-CCNC: 16 U/L (ref 15–37)
BACTERIA URNS QL MICRO: NEGATIVE /HPF
BASOPHILS # BLD: 0.1 K/UL (ref 0–0.1)
BASOPHILS NFR BLD: 0 % (ref 0–1)
BILIRUB SERPL-MCNC: 0.4 MG/DL (ref 0.2–1)
BILIRUB UR QL: NEGATIVE
BUN SERPL-MCNC: 16 MG/DL (ref 6–20)
BUN/CREAT SERPL: 24 (ref 12–20)
CALCIUM SERPL-MCNC: 9.8 MG/DL (ref 8.5–10.1)
CHLORIDE SERPL-SCNC: 108 MMOL/L (ref 97–108)
CHOLEST SERPL-MCNC: 236 MG/DL
CO2 SERPL-SCNC: 26 MMOL/L (ref 21–32)
COLOR UR: NORMAL
CREAT SERPL-MCNC: 0.67 MG/DL (ref 0.55–1.02)
DIFFERENTIAL METHOD BLD: ABNORMAL
EOSINOPHIL # BLD: 0.1 K/UL (ref 0–0.4)
EOSINOPHIL NFR BLD: 1 % (ref 0–7)
EPITH CASTS URNS QL MICRO: NORMAL /LPF
ERYTHROCYTE [DISTWIDTH] IN BLOOD BY AUTOMATED COUNT: 13.3 % (ref 11.5–14.5)
GLOBULIN SER CALC-MCNC: 3 G/DL (ref 2–4)
GLUCOSE SERPL-MCNC: 75 MG/DL (ref 65–100)
GLUCOSE UR STRIP.AUTO-MCNC: NEGATIVE MG/DL
HCT VFR BLD AUTO: 41.7 % (ref 35–47)
HCV AB SERPL QL IA: NONREACTIVE
HCV COMMENT,HCGAC: NORMAL
HDLC SERPL-MCNC: 65 MG/DL
HDLC SERPL: 3.6 {RATIO} (ref 0–5)
HGB BLD-MCNC: 13.3 G/DL (ref 11.5–16)
HGB UR QL STRIP: NEGATIVE
HYALINE CASTS URNS QL MICRO: NORMAL /LPF (ref 0–5)
IMM GRANULOCYTES # BLD AUTO: 0 K/UL (ref 0–0.04)
IMM GRANULOCYTES NFR BLD AUTO: 0 % (ref 0–0.5)
KETONES UR QL STRIP.AUTO: NEGATIVE MG/DL
LDLC SERPL CALC-MCNC: 150.4 MG/DL (ref 0–100)
LEUKOCYTE ESTERASE UR QL STRIP.AUTO: NEGATIVE
LYMPHOCYTES # BLD: 3 K/UL (ref 0.8–3.5)
LYMPHOCYTES NFR BLD: 26 % (ref 12–49)
MCH RBC QN AUTO: 30.3 PG (ref 26–34)
MCHC RBC AUTO-ENTMCNC: 31.9 G/DL (ref 30–36.5)
MCV RBC AUTO: 95 FL (ref 80–99)
MONOCYTES # BLD: 0.7 K/UL (ref 0–1)
MONOCYTES NFR BLD: 6 % (ref 5–13)
NEUTS SEG # BLD: 7.5 K/UL (ref 1.8–8)
NEUTS SEG NFR BLD: 67 % (ref 32–75)
NITRITE UR QL STRIP.AUTO: NEGATIVE
NRBC # BLD: 0 K/UL (ref 0–0.01)
NRBC BLD-RTO: 0 PER 100 WBC
PH UR STRIP: 5 [PH] (ref 5–8)
PLATELET # BLD AUTO: 364 K/UL (ref 150–400)
PMV BLD AUTO: 10.2 FL (ref 8.9–12.9)
POTASSIUM SERPL-SCNC: 4.4 MMOL/L (ref 3.5–5.1)
PROT SERPL-MCNC: 7 G/DL (ref 6.4–8.2)
PROT UR STRIP-MCNC: NEGATIVE MG/DL
RBC # BLD AUTO: 4.39 M/UL (ref 3.8–5.2)
RBC #/AREA URNS HPF: NORMAL /HPF (ref 0–5)
SODIUM SERPL-SCNC: 140 MMOL/L (ref 136–145)
SP GR UR REFRACTOMETRY: 1.02 (ref 1–1.03)
TRIGL SERPL-MCNC: 103 MG/DL (ref ?–150)
TSH SERPL DL<=0.05 MIU/L-ACNC: 1.61 UIU/ML (ref 0.36–3.74)
UA: UC IF INDICATED,UAUC: NORMAL
UROBILINOGEN UR QL STRIP.AUTO: 0.2 EU/DL (ref 0.2–1)
VLDLC SERPL CALC-MCNC: 20.6 MG/DL
WBC # BLD AUTO: 11.4 K/UL (ref 3.6–11)
WBC URNS QL MICRO: NORMAL /HPF (ref 0–4)

## 2021-07-28 RX ORDER — ROSUVASTATIN CALCIUM 5 MG/1
5 TABLET, COATED ORAL DAILY
Qty: 30 TABLET | Refills: 1 | Status: SHIPPED | OUTPATIENT
Start: 2021-07-28 | End: 2021-09-14 | Stop reason: SDUPTHER

## 2021-07-28 NOTE — PROGRESS NOTES
Labs stable except LDL cholesterol high at 150 - recommend start of crestor 5 mg daily to reduce cardiac risk  Recheck FLP, CPK and LFT's in 1 month 19

## 2021-07-28 NOTE — TELEPHONE ENCOUNTER
Patient advised of lab results- please send pended Rx to pharmacy:  Requested Prescriptions     Pending Prescriptions Disp Refills    rosuvastatin (CRESTOR) 5 mg tablet 30 Tablet 1     Sig: Take 1 Tablet by mouth daily.

## 2021-07-28 NOTE — TELEPHONE ENCOUNTER
----- Message from Linda Hagen MD sent at 7/28/2021  5:28 AM EDT -----  Labs stable except LDL cholesterol high at 150 - recommend start of crestor 5 mg daily to reduce cardiac risk  Recheck FLP, CPK and LFT's in 1 month

## 2021-08-18 ENCOUNTER — TELEPHONE (OUTPATIENT)
Dept: INTERNAL MEDICINE CLINIC | Age: 46
End: 2021-08-18

## 2021-08-18 NOTE — TELEPHONE ENCOUNTER
Spoke to patient and rescheduled her lab appointment for:  Next Appt  09/13/2021 - LAB ONLY - [facility] Savita Chandler

## 2021-08-18 NOTE — TELEPHONE ENCOUNTER
Patient called stating she started Crestor 2 weeks after her last visit. She was supposed to be coming back on 8/30 for a lab only appt to check cholesterol. Wants to know if she should reschedule it out a couple of weeks to allow the med to work?     Call patient at [phone]

## 2021-08-21 RX ORDER — CYANOCOBALAMIN/FOLIC AC/VIT B6 1-2.2-25MG
TABLET ORAL
Qty: 90 TABLET | Refills: 0 | Status: SHIPPED | OUTPATIENT
Start: 2021-08-21 | End: 2021-12-12

## 2021-09-13 ENCOUNTER — LAB ONLY (OUTPATIENT)
Dept: INTERNAL MEDICINE CLINIC | Age: 46
End: 2021-09-13

## 2021-09-13 DIAGNOSIS — E78.00 ELEVATED CHOLESTEROL: Primary | ICD-10-CM

## 2021-09-13 LAB
ALBUMIN SERPL-MCNC: 3.9 G/DL (ref 3.5–5)
ALBUMIN/GLOB SERPL: 1.3 {RATIO} (ref 1.1–2.2)
ALP SERPL-CCNC: 93 U/L (ref 45–117)
ALT SERPL-CCNC: 22 U/L (ref 12–78)
AST SERPL-CCNC: 15 U/L (ref 15–37)
BILIRUB DIRECT SERPL-MCNC: 0.1 MG/DL (ref 0–0.2)
BILIRUB SERPL-MCNC: 0.4 MG/DL (ref 0.2–1)
CHOLEST SERPL-MCNC: 188 MG/DL
CK SERPL-CCNC: 108 U/L (ref 26–192)
GLOBULIN SER CALC-MCNC: 3.1 G/DL (ref 2–4)
HDLC SERPL-MCNC: 79 MG/DL
HDLC SERPL: 2.4 {RATIO} (ref 0–5)
LDLC SERPL CALC-MCNC: 95.8 MG/DL (ref 0–100)
PROT SERPL-MCNC: 7 G/DL (ref 6.4–8.2)
TRIGL SERPL-MCNC: 66 MG/DL (ref ?–150)
VLDLC SERPL CALC-MCNC: 13.2 MG/DL

## 2021-09-14 RX ORDER — ROSUVASTATIN CALCIUM 5 MG/1
5 TABLET, COATED ORAL DAILY
Qty: 90 TABLET | Refills: 1 | Status: SHIPPED | OUTPATIENT
Start: 2021-09-14 | End: 2022-04-18 | Stop reason: SDUPTHER

## 2021-09-14 NOTE — TELEPHONE ENCOUNTER
Last Refill: 7-28-21  Last Visit: 7/27/2021   Next Visit: Visit date not found     Requested Prescriptions     Pending Prescriptions Disp Refills    rosuvastatin (CRESTOR) 5 mg tablet 90 Tablet 1     Sig: Take 1 Tablet by mouth daily.

## 2021-12-12 RX ORDER — CYANOCOBALAMIN/FOLIC AC/VIT B6 1-2.2-25MG
TABLET ORAL
Qty: 90 TABLET | Refills: 0 | Status: SHIPPED | OUTPATIENT
Start: 2021-12-12 | End: 2022-04-18 | Stop reason: SDUPTHER

## 2022-01-19 RX ORDER — ACYCLOVIR 400 MG/1
TABLET ORAL
Qty: 25 TABLET | Refills: 0 | Status: SHIPPED | OUTPATIENT
Start: 2022-01-19

## 2022-01-22 ENCOUNTER — PATIENT MESSAGE (OUTPATIENT)
Dept: OBGYN CLINIC | Age: 47
End: 2022-01-22

## 2022-01-24 ENCOUNTER — OFFICE VISIT (OUTPATIENT)
Dept: OBGYN CLINIC | Age: 47
End: 2022-01-24

## 2022-01-24 VITALS
HEART RATE: 71 BPM | DIASTOLIC BLOOD PRESSURE: 79 MMHG | WEIGHT: 193.4 LBS | SYSTOLIC BLOOD PRESSURE: 116 MMHG | BODY MASS INDEX: 31.69 KG/M2

## 2022-01-24 DIAGNOSIS — Z01.419 ENCOUNTER FOR GYNECOLOGICAL EXAMINATION (GENERAL) (ROUTINE) WITHOUT ABNORMAL FINDINGS: Primary | ICD-10-CM

## 2022-01-24 PROCEDURE — 99396 PREV VISIT EST AGE 40-64: CPT | Performed by: OBSTETRICS & GYNECOLOGY

## 2022-01-24 NOTE — PROGRESS NOTES
[facility]  [email or web address]  [phone]    Carla Knapp MD, [address] [facility]       Annual Gynecologic Exam  WWE 40-60  Chief Complaint   Patient presents with    Well Woman   Joana Gamino is a 51 y.o.  [address] [facility] [place]  female who presents for an annual exam.  Patient's last menstrual period was 2022. Adan Strange Patient has the following concerns today: None. Menstrual status:  She does not report dysmenorrhea/painful menses. She does not report heavy menses. She does not report irregular bleeding. Sexual history and Contraception:  Social History     Substance and Sexual Activity   Sexual Activity Yes    Partners: Male    Birth control/protection: Surgical    Comment: Vasectomy       She does not reports new sexual partner(s) in the last year. Preventive Medicine History:  Her most recent Pap smear result: normal was obtained in 2021    Her most recent HR HPV screen was Negative obtained in     She does not have a history of DEDE 2, 3 or cervical cancer. Breast health:  [facility] Results (most recent):  Results from [place] encounter on 21    [facility] 3D JOSELUIS W MAMMO BI SCREENING INCL CAD    Narrative  STUDY: Bilateral digital screening mammogram with 3-D tomosynthesis    INDICATION:  Screening. COMPARISON: Most recent 2018    BREAST COMPOSITION: The breasts are heterogeneously dense, which may obscure  small masses. FINDINGS: Bilateral digital screening mammography was performed and is  interpreted in conjunction with a computer assisted detection (CAD) system. Additionally, tomosynthesis of both breasts in the CC and MLO projections was  performed. No suspicious masses or calcifications are identified. There has been  no significant change. Impression  BI-RADS 1: Negative. No mammographic evidence of malignancy. RECOMMENDATIONS:  Next screening mammogram is recommended in one year.     The patient will be notified of these results. In accordance with [place] legislation enacted 2012 (32.1-229 of the Code  of [place]), we have informed this patient by letter that she may have dense  breast tissue. Dense breast tissue can hide cancer or other abnormalities. We  have instructed her to contact her referring physician if she has any questions  or concerns about this report. There are many factors that can increase a  woman's risk of developing breast cancer, including a family history of breast  cancer. A woman's lifetime risk of developing breast cancer can be estimated  using the Breast Cancer Risk Assessment Tool, found on the [employer] website (www.cancer.gov/bcrisktool/). The addition of breast MRI as a  screening tool may be useful for women with lifetime risk assessments greater  than 20%. Last mammogram: approximate date 2021 and was normal.   Breast cancer family updated: see FH.      Past Medical History:   Diagnosis Date    Abnormal Pap smear     HPV and colpo wnl fu    Anxiety     Anxiety associated with depression 2017    Fibrocystic breast     History of mammogram 21, 9/23/15; 17; 10/1/18    BI-RADS 1: Negative dense, Negative; Negative; Negative (dense)    Lipoma     forehead and shoulder removed in     MTHFR mutation     MTHFR mutation 2017    Pap smear for cervical cancer screening 21, 3/24/10; 9/23/15; 10/15/18    neg hpv neg; Negative, HPV negative;  negative/HPV neg    Protein C deficiency ([facility] [address] 2017    And a prothrombin gene mutation    Recurrent cold sores 2017     OB History    Para Term  AB Living   4 2 2 0 2 2   SAB IAB Ectopic Molar Multiple Live Births   2 0 0   0 2      # Outcome Date GA Lbr Ruiz/2nd Weight Sex Delivery Anes PTL Lv   4 SAB            3 SAB            2 Term            1 Term                Past Surgical History:   Procedure Laterality Date    HX BREAST BIOPSY      HX COLPOSCOPY  1993    HX OTHER SURGICAL      facial surgery    HX SHOULDER ARTHROSCOPY  2008    lump removed- lipoma    HX TONSILLECTOMY       Family History   Problem Relation Age of Onset    Hypertension Father     High Cholesterol Father     Stroke Paternal Grandmother      Social History     Socioeconomic History    Marital status:      Spouse name: Not on file    Number of children: 2    Years of education: Not on file    Highest education level: Not on file   Occupational History    Occupation:    Tobacco Use    Smoking status: Never Smoker    Smokeless tobacco: Never Used   Vaping Use    Vaping Use: Never used   Substance and Sexual Activity    Alcohol use: Yes     Comment: socially    Drug use: No    Sexual activity: Yes     Partners: Male     Birth control/protection: Surgical     Comment: Vasectomy   Other Topics Concern    Not on file   Social History Narrative    Not on file     Social Determinants of Health     Financial Resource Strain:     Difficulty of Paying Living Expenses: Not on file   Food Insecurity:     Worried About [address] [facility] [place] in the Last Year: Not on file    Jayla of Food in the Last Year: Not on file   Transportation Needs:     Lack of Transportation (Medical): Not on file    Lack of Transportation (Non-Medical):  Not on file   Physical Activity:     Days of Exercise per Week: Not on file    Minutes of Exercise per Session: Not on file   Stress:     Feeling of Stress : Not on file   Social Connections:     Frequency of Communication with Friends and Family: Not on file    Frequency of Social Gatherings with Friends and Family: Not on file    Attends Druze Services: Not on file    Active Member of Clubs or Organizations: Not on file    Attends Club or Organization Meetings: Not on file    Marital Status: Not on file   Intimate Partner Violence:     Fear of Current or Ex-Partner: Not on file    Emotionally Abused: Not on file    Physically Abused: Not on file    Sexually Abused: Not on file   Housing Stability:     Unable to Pay for Housing in the Last Year: Not on file    Number of Places Lived in the Last Year: Not on file    Unstable Housing in the Last Year: Not on file       Allergies   Allergen Reactions    Aamir Hives    Penicillins Unknown (comments)       Current Outpatient Medications   Medication Sig    acyclovir (ZOVIRAX) 400 mg tablet TAKE 1 TABLET BY MOUTH FOUR TIMES DAILY    [facility] 2.2-25-1 mg tab TAKE 1 TABLET BY MOUTH DAILY    sertraline (ZOLOFT) 100 mg tablet TAKE 1 TABLET BY MOUTH EVERY DAY    rosuvastatin (CRESTOR) 5 mg tablet Take 1 Tablet by mouth daily.  acyclovir (ZOVIRAX) 5 % ointment Apply  to affected area every three (3) hours.  multivitamin (ONE A DAY) tablet Take 1 Tab by mouth daily.  VITAMIN E ACETATE (VITAMIN E PO) Take  by mouth.  aspirin delayed-release 81 mg tablet Take  by mouth daily.  CALCIUM CARBONATE (CALCIUM 500 PO) Take  by mouth.  DOCOSAHEXANOIC ACID/EPA (FISH OIL PO) Take  by mouth. No current facility-administered medications for this visit.        Patient Active Problem List   Diagnosis Code    Anxiety associated with depression F41.8    Protein C deficiency ([facility] [address] D68.59    Recurrent cold sores B00.1    Fibrocystic breast N60.19    MTHFR mutation Z15.89       Review of Systems - History obtained from the patient  Constitutional/general, HEENT, CV, Resp, GI, MSK, Neuro, Psych, Heme/lymph, Skin, Breast ROS: no significant complaints except as noted on HPI     Physical Exam  Visit Vitals  /79 (BP 1 Location: Right arm, BP Patient Position: Sitting, BP Cuff Size: Adult)   Pulse 71   Wt 193 lb 6.4 oz (87.7 kg)   LMP 01/12/2022   BMI 31.69 kg/m²       Constitutional  · Appearance: well-nourished, well developed, alert, in no acute distress    HENT  · Head and Face: appears normal    Neck  · Inspection/Palpation: normal appearance, no masses or tenderness  · Lymph Nodes: no lymphadenopathy present  · Thyroid: gland size normal, nontender, no nodules or masses present on palpation    Chest  · Respiratory Effort: breathing unlabored  · Auscultation: normal breath sounds    Cardiovascular  · Heart:  · Auscultation: regular rate and rhythm without murmur    Breasts  · Inspection of Breasts: breasts symmetrical, no skin changes, no discharge present, nipple appearance normal, no skin retraction present  · Palpation of Breasts and Axillae: no masses present on palpation, no breast tenderness  · Axillary Lymph Nodes: no lymphadenopathy present    Gastrointestinal  · Abdominal Examination: abdomen non-tender to palpation, normal bowel sounds, no masses present  · Liver and spleen: no hepatomegaly present, spleen not palpable  · Hernias: no hernias identified    Genitourinary  · External Genitalia: normal appearance for age, no discharge present, no tenderness present, no inflammatory lesions present, no masses present, without atrophy present  · Vagina: normal vaginal vault without central or paravaginal defects, no discharge present, no inflammatory lesions present, no masses present  · Bladder: non-tender to palpation  · Urethra: appears normal  · Cervix: normal   · Uterus: normal size, shape and consistency  · Adnexa: no adnexal tenderness present, no adnexal masses present  · Perineum: perineum within normal limits, no evidence of trauma, no rashes or skin lesions present  · Anus: anus within normal limits, no hemorrhoids present  · Inguinal Lymph Nodes: no lymphadenopathy present    Skin  · General Inspection: no rash, no lesions identified    Neurologic/Psychiatric  · Mental Status:  · Orientation: grossly oriented to person, place and time  · Mood and Affect: mood normal, affect appropriate    Assessment:  51 y.o.  for well woman exam  Encounter Diagnosis   Name Primary?     Encounter for gynecological examination (general) (routine) without abnormal findings Yes       Plan:  The patient was counseled about healthy lifestyle, disease prevention, and bone protection. We discussed current self breast exam and mammogram recommendations  We discussed current pap smear and HR HPV testing guidelines  I recommended follow up in one year for routine annual gynecologic exam or sooner if needed  I recommended follow up with a primary care physician for chronic medical problems and evaluation of non-gynecologic concerns and to please contact our office with any GYN questions or concerns. Folllow up:  [x] return for annual well woman exam in one year or sooner if she is having problems  [] follow up and ultrasound  [x] mammogram  [] 6 months  [] 3 months  [] 1 month    No orders of the defined types were placed in this encounter. No results found for any visits on 01/24/22.

## 2022-01-24 NOTE — PATIENT INSTRUCTIONS
Well Visit, Ages 25 to 48: Care Instructions  Overview     Well visits can help you stay healthy. Your doctor has checked your overall health and may have suggested ways to take good care of yourself. Your doctor also may have recommended tests. At home, you can help prevent illness with healthy eating, regular exercise, and other steps. Follow-up care is a key part of your treatment and safety. Be sure to make and go to all appointments, and call your doctor if you are having problems. It's also a good idea to know your test results and keep a list of the medicines you take. How can you care for yourself at home? · Get screening tests that you and your doctor decide on. Screening helps find diseases before any symptoms appear. · Eat healthy foods. Choose fruits, vegetables, whole grains, protein, and low-fat dairy foods. Limit fat, especially saturated fat. Reduce salt in your diet. · Limit alcohol. If you are a man, have no more than 2 drinks a day or 14 drinks a week. If you are a woman, have no more than 1 drink a day or 7 drinks a week. · Get at least 30 minutes of physical activity on most days of the week. Walking is a good choice. You also may want to do other activities, such as running, swimming, cycling, or playing tennis or team sports. Discuss any changes in your exercise program with your doctor. · Reach and stay at a healthy weight. This will lower your risk for many problems, such as obesity, diabetes, heart disease, and high blood pressure. · Do not smoke or allow others to smoke around you. If you need help quitting, talk to your doctor about stop-smoking programs and medicines. These can increase your chances of quitting for good. · Care for your mental health. It is easy to get weighed down by worry and stress. Learn strategies to manage stress, like deep breathing and mindfulness, and stay connected with your family and community.  If you find you often feel sad or hopeless, talk with your doctor. Treatment can help. · Talk to your doctor about whether you have any risk factors for sexually transmitted infections (STIs). You can help prevent STIs if you wait to have sex with a new partner (or partners) until you've each been tested for STIs. It also helps if you use condoms (male or female condoms) and if you limit your sex partners to one person who only has sex with you. Vaccines are available for some STIs, such as HPV. · Use birth control if it's important to you to prevent pregnancy. Talk with your doctor about the choices available and what might be best for you. · If you think you may have a problem with alcohol or drug use, talk to your doctor. This includes prescription medicines (such as amphetamines and opioids) and illegal drugs (such as cocaine and methamphetamine). Your doctor can help you figure out what type of treatment is best for you. · Protect your skin from too much sun. When you're outdoors from 10 a.m. to 4 p.m., stay in the shade or cover up with clothing and a hat with a wide brim. Wear sunglasses that block UV rays. Even when it's cloudy, put broad-spectrum sunscreen (SPF 30 or higher) on any exposed skin. · See a dentist one or two times a year for checkups and to have your teeth cleaned. · Wear a seat belt in the car. When should you call for help? Watch closely for changes in your health, and be sure to contact your doctor if you have any problems or symptoms that concern you. Where can you learn more? Go to http://www.[employer].com/  Enter P072 in the search box to learn more about \"Well Visit, Ages 25 to 48: Care Instructions. \"  Current as of: February 11, 2021               Content Version: 13.0  © 4373-0590 [employer]. Care instructions adapted under license by [employer] (which disclaims liability or warranty for this information).  If you have questions about a medical condition or this instruction, always ask your healthcare professional. [place] [address] any warranty or liability for your use of this information.

## 2022-03-18 PROBLEM — D68.59 PROTEIN C DEFICIENCY (HCC): Status: ACTIVE | Noted: 2017-12-13

## 2022-03-18 PROBLEM — F41.8 ANXIETY ASSOCIATED WITH DEPRESSION: Status: ACTIVE | Noted: 2017-12-13

## 2022-03-19 PROBLEM — N60.19 FIBROCYSTIC BREAST: Status: ACTIVE | Noted: 2017-12-13

## 2022-03-19 PROBLEM — B00.1 RECURRENT COLD SORES: Status: ACTIVE | Noted: 2017-12-13

## 2022-03-19 PROBLEM — Z15.89 MTHFR MUTATION: Status: ACTIVE | Noted: 2017-12-20

## 2022-04-18 RX ORDER — ROSUVASTATIN CALCIUM 5 MG/1
5 TABLET, COATED ORAL DAILY
Qty: 90 TABLET | Refills: 1 | Status: SHIPPED | OUTPATIENT
Start: 2022-04-18 | End: 2022-10-24

## 2022-04-18 RX ORDER — SERTRALINE HYDROCHLORIDE 100 MG/1
100 TABLET, FILM COATED ORAL DAILY
Qty: 90 TABLET | Refills: 1 | Status: SHIPPED | OUTPATIENT
Start: 2022-04-18 | End: 2022-10-24

## 2022-04-18 RX ORDER — CYANOCOBALAMIN/FOLIC AC/VIT B6 1-2.2-25MG
1 TABLET ORAL DAILY
Qty: 90 TABLET | Refills: 3 | Status: SHIPPED | OUTPATIENT
Start: 2022-04-18 | End: 2023-04-13

## 2022-04-18 NOTE — TELEPHONE ENCOUNTER
PCP: Dong Jacome MD    Last appt: 7/27/2021  Future Appointments   Date Time Provider Júnior Haro   1/25/2023  8:20 AM MARY MARIAM IVÁN [facility] AMB   1/25/2023  8:40 AM Quentin Naqvi MD [facility] BS AMB       Requested Prescriptions     Pending Prescriptions Disp Refills    folic acid-vit M8-QAZ E43 ([facility]) 2.2-25-1 mg tab 90 Tablet 0     Sig: Take 1 Tablet by mouth daily.  sertraline (ZOLOFT) 100 mg tablet 90 Tablet 1     Sig: Take 1 Tablet by mouth daily.  rosuvastatin (CRESTOR) 5 mg tablet 90 Tablet 1     Sig: Take 1 Tablet by mouth daily.        Prior labs and Blood pressures:  BP Readings from Last 3 Encounters:   01/24/22 116/79   07/27/21 120/78   01/20/21 109/73     Lab Results   Component Value Date/Time    Sodium 140 07/27/2021 02:20 PM    Potassium 4.4 07/27/2021 02:20 PM    Chloride 108 07/27/2021 02:20 PM    CO2 26 07/27/2021 02:20 PM    Anion gap 6 07/27/2021 02:20 PM    Glucose 75 07/27/2021 02:20 PM    BUN 16 07/27/2021 02:20 PM    Creatinine 0.67 07/27/2021 02:20 PM    BUN/Creatinine ratio 24 (H) 07/27/2021 02:20 PM    GFR est AA >60 07/27/2021 02:20 PM    GFR est non-AA >60 07/27/2021 02:20 PM    Calcium 9.8 07/27/2021 02:20 PM     No results found for: HBA1C, [id number], [id number]  Lab Results   Component Value Date/Time    Cholesterol, total 188 09/13/2021 09:31 AM    Cholesterol (POC) 230.0 (A) 12/20/2017 03:50 PM    HDL Cholesterol 79 09/13/2021 09:31 AM    HDL Cholesterol (POC) 84.0 12/20/2017 03:50 PM    LDL Cholesterol (POC) 120.6 12/20/2017 03:50 PM    LDL, calculated 95.8 09/13/2021 09:31 AM    VLDL, calculated 13.2 09/13/2021 09:31 AM    Triglyceride 66 09/13/2021 09:31 AM    Triglycerides (POC) 127.0 12/20/2017 03:50 PM    CHOL/HDL Ratio 2.4 09/13/2021 09:31 AM     No results found for: CIRILO Sheikh    Lab Results   Component Value Date/Time    TSH 1.61 07/27/2021 02:20 PM    TSH, 3rd generation 1.11 07/27/2020 02:09 PM

## 2022-10-24 RX ORDER — ROSUVASTATIN CALCIUM 5 MG/1
5 TABLET, COATED ORAL DAILY
Qty: 90 TABLET | Refills: 1 | Status: SHIPPED | OUTPATIENT
Start: 2022-10-24

## 2022-10-24 RX ORDER — SERTRALINE HYDROCHLORIDE 100 MG/1
100 TABLET, FILM COATED ORAL DAILY
Qty: 90 TABLET | Refills: 1 | Status: SHIPPED | OUTPATIENT
Start: 2022-10-24

## 2022-12-05 RX ORDER — ACYCLOVIR 400 MG/1
TABLET ORAL
Qty: 25 TABLET | Refills: 0 | Status: SHIPPED | OUTPATIENT
Start: 2022-12-05

## 2022-12-05 NOTE — TELEPHONE ENCOUNTER
PCP: Cynthia Donato MD    Last appt: 7/27/2021  Future Appointments   Date Time Provider Júnior Haro   1/25/2023  8:20 AM MARY MINOR [facility] BS AMB   1/25/2023  8:40 AM Donta Diaz MD [facility] BS AMB       Requested Prescriptions     Pending Prescriptions Disp Refills    acyclovir (ZOVIRAX) 400 mg tablet 25 Tablet 0       Prior labs and Blood pressures:  BP Readings from Last 3 Encounters:   01/24/22 116/79   07/27/21 120/78   01/20/21 109/73     Lab Results   Component Value Date/Time    Sodium 140 07/27/2021 02:20 PM    Potassium 4.4 07/27/2021 02:20 PM    Chloride 108 07/27/2021 02:20 PM    CO2 26 07/27/2021 02:20 PM    Anion gap 6 07/27/2021 02:20 PM    Glucose 75 07/27/2021 02:20 PM    BUN 16 07/27/2021 02:20 PM    Creatinine 0.67 07/27/2021 02:20 PM    BUN/Creatinine ratio 24 (H) 07/27/2021 02:20 PM    GFR est AA >60 07/27/2021 02:20 PM    GFR est non-AA >60 07/27/2021 02:20 PM    Calcium 9.8 07/27/2021 02:20 PM     No results found for: HBA1C, NEG6UCXT, VQP6CSJA  Lab Results   Component Value Date/Time    Cholesterol, total 188 09/13/2021 09:31 AM    Cholesterol (POC) 230.0 (A) 12/20/2017 03:50 PM    HDL Cholesterol 79 09/13/2021 09:31 AM    HDL Cholesterol (POC) 84.0 12/20/2017 03:50 PM    LDL Cholesterol (POC) 120.6 12/20/2017 03:50 PM    LDL, calculated 95.8 09/13/2021 09:31 AM    VLDL, calculated 13.2 09/13/2021 09:31 AM    Triglyceride 66 09/13/2021 09:31 AM    Triglycerides (POC) 127.0 12/20/2017 03:50 PM    CHOL/HDL Ratio 2.4 09/13/2021 09:31 AM     No results found for: CIRILO Heller    Lab Results   Component Value Date/Time    TSH 1.61 07/27/2021 02:20 PM    TSH, 3rd generation 1.11 07/27/2020 02:09 PM

## 2022-12-05 NOTE — TELEPHONE ENCOUNTER
Patient stated that she noticed she has another fever blister that has popped up today. Please order.

## 2023-01-20 ENCOUNTER — PATIENT MESSAGE (OUTPATIENT)
Dept: OBGYN CLINIC | Age: 48
End: 2023-01-20

## 2023-01-25 ENCOUNTER — OFFICE VISIT (OUTPATIENT)
Dept: OBGYN CLINIC | Age: 48
End: 2023-01-25

## 2023-01-25 VITALS — WEIGHT: 200 LBS | SYSTOLIC BLOOD PRESSURE: 110 MMHG | DIASTOLIC BLOOD PRESSURE: 78 MMHG | BODY MASS INDEX: 32.78 KG/M2

## 2023-01-25 DIAGNOSIS — Z01.419 ENCOUNTER FOR GYNECOLOGICAL EXAMINATION (GENERAL) (ROUTINE) WITHOUT ABNORMAL FINDINGS: Primary | ICD-10-CM

## 2023-01-25 PROCEDURE — 99396 PREV VISIT EST AGE 40-64: CPT | Performed by: OBSTETRICS & GYNECOLOGY

## 2023-01-25 NOTE — PROGRESS NOTES
[facility]  [email or web address]  [phone]    Rajwinder Gay MD, [address] [facility]     Annual Gynecologic Exam:  Chief Complaint   Patient presents with    Well Woman    Opal Hill is marge [place] ,  49 y.o. female   Patient's last menstrual period was 01/09/2023. She presents for her annual checkup. She is having no significant problems. Per Rooming Note:  Patient's last menstrual period was 01/09/2023. Her periods are normal in flow and usually regular with a 26-32 day interval with 3-7 day duration. She does not have dysmenorrhea. Problems: no significant problems  Birth Control: vasectomy. Last Pap: normal obtained 2 year(s) ago. She does not have a history of DEDE 2, 3 or cervical cancer. Last Mammogram: had her mammogram today in our office.   It was see report     Sexual history and Contraception:  Social History     Substance and Sexual Activity   Sexual Activity Yes    Partners: Male    Birth control/protection: Surgical    Comment: Vasectomy       Past Medical History:   Diagnosis Date    Abnormal Pap smear 1993    HPV and colpo wnl fu    Anxiety     Anxiety associated with depression 12/13/2017    Fibrocystic breast     H/O mammogram 01/24/2022    norm/dense    History of mammogram 1/20/21, 9/23/15; 9/29/17; 10/1/18    BI-RADS 1: Negative dense, Negative; Negative; Negative (dense)    Lipoma     forehead and shoulder removed in 2008    MTHFR mutation     MTHFR mutation 12/20/2017    Pap smear for cervical cancer screening 1/20/21, 3/24/10; 9/23/15; 10/15/18    neg hpv neg; Negative, HPV negative;  negative/HPV neg    Protein C deficiency ([facility] [address] 12/13/2017    And a prothrombin gene mutation    Recurrent cold sores 12/13/2017     Current Outpatient Medications   Medication Sig    acyclovir (ZOVIRAX) 400 mg tablet TAKE 1 TABLET BY MOUTH FOUR TIMES DAILY    rosuvastatin (CRESTOR) 5 mg tablet TAKE 1 TABLET BY MOUTH DAILY    sertraline (ZOLOFT) 100 mg tablet TAKE 1 TABLET BY MOUTH DAILY    folic acid-vit M7-EZY A77 ([facility]) 2.2-25-1 mg tab Take 1 Tablet by mouth daily for 360 days. acyclovir (ZOVIRAX) 5 % ointment Apply  to affected area every three (3) hours. multivitamin (ONE A DAY) tablet Take 1 Tab by mouth daily. VITAMIN E ACETATE (VITAMIN E PO) Take  by mouth. aspirin delayed-release 81 mg tablet Take  by mouth daily. CALCIUM CARBONATE (CALCIUM 500 PO) Take  by mouth. DOCOSAHEXANOIC ACID/EPA (FISH OIL PO) Take  by mouth. No current facility-administered medications for this visit.      OB History    Para Term  AB Living   4 2 2 0 2 2   SAB IAB Ectopic Molar Multiple Live Births   2 0 0   0 2      # Outcome Date GA Lbr Ruiz/2nd Weight Sex Delivery Anes PTL Lv   4 SAB            3 SAB            2 Term            1 Term              Past Surgical History:   Procedure Laterality Date    HX BREAST BIOPSY      HX COLPOSCOPY      HX OTHER SURGICAL      facial surgery    HX SHOULDER ARTHROSCOPY      lump removed- lipoma    HX TONSILLECTOMY       Family History   Problem Relation Age of Onset    Stroke Paternal Grandmother     Breast Cancer Paternal Aunt     Hypertension Father     High Cholesterol Father      Social History     Socioeconomic History    Marital status:      Spouse name: Not on file    Number of children: 2    Years of education: Not on file    Highest education level: Not on file   Occupational History    Occupation:    Tobacco Use    Smoking status: Never    Smokeless tobacco: Never   Vaping Use    Vaping Use: Never used   Substance and Sexual Activity    Alcohol use: Yes     Comment: socially    Drug use: No    Sexual activity: Yes     Partners: Male     Birth control/protection: Surgical     Comment: Vasectomy   Other Topics Concern    Not on file   Social History Narrative    Not on file     Social Determinants of Health     Financial Resource Strain: Not on file   Food Insecurity: Not on file   Transportation Needs: Not on file   Physical Activity: Not on file   Stress: Not on file   Social Connections: Not on file   Intimate Partner Violence: Not on file   Housing Stability: Not on file     Tobacco History:  reports that she has never smoked. She has never used smokeless tobacco.  Alcohol Abuse:  reports current alcohol use. Drug Abuse:  reports no history of drug use. Allergies   Allergen Reactions    Aamir Hives    Penicillins Unknown (comments)       Patient Active Problem List   Diagnosis Code    Anxiety associated with depression F41.8    [facility] D68.59    Recurrent cold sores B00.1    Fibrocystic breast N60.19    MTHFR mutation Z15.89       Review of Systems - History obtained from the patient and patient filled out questionnaire   Constitutional/general, HEENT, CV, Resp, GI, MSK, Neuro, Psych, Heme/lymph, Skin, Breast ROS: no significant complaints except as noted on HPI    Physical Exam  Visit Vitals  /78   Wt 200 lb (90.7 kg)   LMP 01/09/2023   BMI 32.78 kg/m²       Constitutional  Appearance: well-nourished, well developed, alert, in no acute distress    HENT  Head and Face: appears normal    Neck  Inspection/Palpation: normal appearance, no masses or tenderness  Lymph Nodes: no lymphadenopathy present  Thyroid: gland size normal, nontender, no nodules or masses present on palpation    Chest  Respiratory Effort: breathing unlabored  Auscultation: normal breath sounds    Cardiovascular  Heart:   Auscultation: regular rate and rhythm without murmur    Breasts  Inspection of Breasts: breasts symmetrical, no skin changes, no discharge present, nipple appearance normal, no skin retraction present  Palpation of Breasts and Axillae: no masses present on palpation, no breast tenderness  Axillary Lymph Nodes: no lymphadenopathy present    Gastrointestinal  Abdominal Examination: abdomen non-tender to palpation, normal bowel sounds, no masses present  Liver and spleen: no hepatomegaly present, spleen not palpable  Hernias: no hernias identified    Genitourinary  External Genitalia: normal appearance for age, no discharge present, no tenderness present, no inflammatory lesions present, no masses present  Vagina: normal vaginal vault without central or paravaginal defects, minimal discharge present, no inflammatory lesions present, no masses present  Bladder: non-tender to palpation  Urethra: appears normal  Cervix: normal   Uterus: normal size, shape and consistency  Adnexa: no adnexal tenderness present, no adnexal masses present  Perineum: perineum within normal limits, no evidence of trauma, no rashes or skin lesions present  Anus: anus within normal limits, no hemorrhoids present  Inguinal Lymph Nodes: no lymphadenopathy present    Skin  General Inspection: no rash, no lesions identified    Neurologic/Psychiatric  Mental Status:  Orientation: grossly oriented to person, place and time  Mood and Affect: mood normal, affect appropriate    Assessment:  49 y.o. N2  for well woman exam  Her current medical status is satisfactory with no evidence of significant gynecologic issues. Encounter Diagnosis   Name Primary? Encounter for gynecological examination (general) (routine) without abnormal findings Yes       Plan:  The patient was counseled about diet, exercise, healthy lifestyle  I recommend annual well woman exams  We discussed current pap smear and HR HPV testing guidelines. I recommended follow up one year for routine annual gynecologic exam or sooner prn  Handouts were given to the patient  I recommended follow up with a primary care physician for chronic medical problems and evaluation of non-gynecologic concerns and to please contact our office with any GYN questions or concerns.   I recommended testing per CDC guidelines and at patient request.   Disc causes of RLQ pain, rec colonsocopy, fu and US if persistent, keep log to see if related to ovulation        Folllow up:  [x] return for annual well woman exam in one year or sooner if she is having problems  [] follow up and ultrasound  [] 6 months  [] 3 months  [] 6 weeks   [] 1 month    No orders of the defined types were placed in this encounter. No results found for any visits on 01/25/23.

## 2023-01-25 NOTE — PROGRESS NOTES
Lisa Asehr is a 49 y.o. female returns for an annual exam     Chief Complaint   Patient presents with    Well Woman    Mammogram       Patient's last menstrual period was 01/09/2023. Her periods are normal in flow and usually regular with a 26-32 day interval with 3-7 day duration. She does not have dysmenorrhea. Problems: no significant problems  Birth Control: vasectomy. Last Pap: normal obtained 2 year(s) ago. She does not have a history of DEDE 2, 3 or cervical cancer. Last Mammogram: had her mammogram today in our office. It was see report       1. Have you been to the ER, urgent care clinic, or hospitalized since your last visit? No    2. Have you seen or consulted any other health care providers outside of the [address] [place] [address] since your last visit? No    Examination chaperoned by Washington Yun LPN.

## 2023-02-13 ENCOUNTER — OFFICE VISIT (OUTPATIENT)
Dept: INTERNAL MEDICINE CLINIC | Age: 48
End: 2023-02-13
Payer: COMMERCIAL

## 2023-02-13 VITALS
BODY MASS INDEX: 32.38 KG/M2 | HEART RATE: 67 BPM | DIASTOLIC BLOOD PRESSURE: 74 MMHG | OXYGEN SATURATION: 98 % | SYSTOLIC BLOOD PRESSURE: 106 MMHG | WEIGHT: 197.6 LBS | TEMPERATURE: 98.3 F

## 2023-02-13 DIAGNOSIS — K80.50 BILIARY COLIC SYMPTOM: ICD-10-CM

## 2023-02-13 DIAGNOSIS — R10.31 RLQ ABDOMINAL PAIN: ICD-10-CM

## 2023-02-13 DIAGNOSIS — R10.9 RIGHT FLANK PAIN: ICD-10-CM

## 2023-02-13 DIAGNOSIS — R10.11 COLICKY RUQ ABDOMINAL PAIN: Primary | ICD-10-CM

## 2023-02-13 PROCEDURE — 99214 OFFICE O/P EST MOD 30 MIN: CPT | Performed by: PHYSICIAN ASSISTANT

## 2023-02-13 NOTE — PROGRESS NOTES
Chief Complaint   Patient presents with    Rib Pain     Right sided    1. Have you been to the ER, urgent care clinic since your last visit? Hospitalized since your last visit?no    2. Have you seen or consulted any other health care providers outside of the [address] [place] [address] since your last visit? Include any pap smears or colon screening.  no

## 2023-02-13 NOTE — PROGRESS NOTES
Subjective:   Terri Duverney is a 49 y.o. female who presents for acute visit c/o right side. back pain x 2 days. Last visit 7/27/21, prior PCP Dr Lanie Ceja who retired last year. She sees her OB/Gyn Dr Cierra Cooper for annual exams. She noted tightness at mid back near bra strap, thought due to hunching over her desk, starting 2 days ago.   Noted this morning upon waking having a \"muscle spasm\" just on right side of mid back, \"catches\" her, directly through to her RUQ, there is a constant dull pain that has intense exacerbations     No N/V, normal appetite, no indigestion  She did not eat breakfast yet this morning just because she is a late eater, so does not know how eating will affect this new pain, she did have coffee without exacerbation in her symptom  No fever  No h/o kidney stone, no dysuria, urgency, frequency; no hematuria, pain does not radiate to groin  Gall bladder intact  She does feel pain under her right shoulder blade  No h/o GERD  No abdominal surgeries  Has had a mild pain right lower abdominal/pelvic discomfort x 2-3 months, feels \"like a pinch\", saw her OB and pelvic exam normal, it is very mild and intermittent, OB wondered if it could be an ovarian cyst that is fluctuating with her menstrual cycle  She had chicken pox as child, had not noted a rash    Notes she has eaten more fatty foods this week, and nachos watching Super Bowl last night, took a melatonin and went to bed and woke up with the pain   Her mother and MGM both had cholecystectomy      Past Medical History:   Diagnosis Date    Abnormal Pap smear 1993    HPV and colpo wnl fu    Anxiety     Anxiety associated with depression 12/13/2017    Fibrocystic breast     H/O mammogram 01/24/2022    norm/dense    H/O mammogram 01/25/2023    low risk/dense    History of mammogram 1/20/21, 9/23/15; 9/29/17; 10/1/18    BI-RADS 1: Negative dense, Negative; Negative; Negative (dense)    Lipoma     forehead and shoulder removed in 2008    MTHFR mutation     MTHFR mutation 12/20/2017    Pap smear for cervical cancer screening 1/20/21, 3/24/10; 9/23/15; 10/15/18    neg hpv neg; Negative, HPV negative;  negative/HPV neg    Protein C deficiency ([address] 12/13/2017    And a prothrombin gene mutation    Recurrent cold sores 12/13/2017     Social History     Tobacco Use    Smoking status: Never    Smokeless tobacco: Never   Vaping Use    Vaping Use: Never used   Substance Use Topics    Alcohol use: Yes     Comment: socially    Drug use: No     Outpatient Medications Marked as Taking for the 2/13/23 encounter (Office Visit) with Rosa Farooq PA-C   Medication Sig Dispense Refill    acyclovir (ZOVIRAX) 400 mg tablet TAKE 1 TABLET BY MOUTH FOUR TIMES DAILY 25 Tablet 0    rosuvastatin (CRESTOR) 5 mg tablet TAKE 1 TABLET BY MOUTH DAILY 90 Tablet 1    sertraline (ZOLOFT) 100 mg tablet TAKE 1 TABLET BY MOUTH DAILY 90 Tablet 1    folic acid-vit U2-HJT L52 ([facility]) 2.2-25-1 mg tab Take 1 Tablet by mouth daily for 360 days. 90 Tablet 3    acyclovir (ZOVIRAX) 5 % ointment Apply  to affected area every three (3) hours. 2 g 0    multivitamin (ONE A DAY) tablet Take 1 Tab by mouth daily. VITAMIN E ACETATE (VITAMIN E PO) Take  by mouth. aspirin delayed-release 81 mg tablet Take  by mouth daily. CALCIUM CARBONATE (CALCIUM 500 PO) Take  by mouth. DOCOSAHEXANOIC ACID/EPA (FISH OIL PO) Take  by mouth. Allergies   Allergen Reactions    Aamir Hives    Penicillins Unknown (comments)        Review of Systems  A comprehensive review of systems was negative except for that written in the HPI. Objective:     Visit Vitals  /74 (BP 1 Location: Left arm, BP Patient Position: Sitting, BP Cuff Size: Adult)   Pulse 67   Temp 98.3 °F (36.8 °C)   Wt 197 lb 9.6 oz (89.6 kg)   SpO2 98%   BMI 32.38 kg/m²     General:   alert, cooperative   Eyes: conjunctivae/scleras clear.  PERRL, EOM's intact   Neck: Supple, trachea midline   Heart: S1 and S2 normal,no murmurs noted    Lungs: Clear to auscultation bilaterally, no increased work of breathing   Abdomen: Soft, Normal bowel sounds  (+)tender RUQ, slightly increased on deep palpation but not a true Purdy's sign;  no hepatomegaly; (+)tender RLQ at McBurney's point with negative iliac and psoas signs, no guarding or rigidity  No CVAT or suprapubic tenderness   Extremities: No edema or cyanosis          No results found for this visit on 02/13/23. Assessment/Plan:     1. Colicky RUQ abdominal pain    2. Right flank pain    3. RLQ abdominal pain    4. Biliary colic symptom          Orders Placed This Encounter    US ABD COMP     Standing Status:   Future     Standing Expiration Date:   3/13/2024     Order Specific Question:   Is Patient Pregnant?      Answer:   No    CBC WITH AUTOMATED DIFF     Standing Status:   Future     Standing Expiration Date:   2/13/2024    URINALYSIS W/ REFLEX CULTURE     Standing Status:   Future     Standing Expiration Date:   2/13/2024    AMYLASE     Standing Status:   Future     Standing Expiration Date:   2/13/2024    LIPASE     Standing Status:   Future     Standing Expiration Date:   9/68/7031    METABOLIC PANEL, BASIC     Standing Status:   Future     Standing Expiration Date:   2/13/2024    HEPATIC FUNCTION PANEL     Standing Status:   Future     Standing Expiration Date:   2/13/2024       She has a colicky right upper quadrant pain, associated with pain in her upper back and under her shoulder blade area for 2 days  She has had a right lower quadrant pain for 2 to 3 months but I am not sure if this is related  She has eaten fattier foods in the last week, also eating fatty foods watching the Super Bowl last night and woke up with a worsening of the pain  I am suspicious this is biliary colic  Could not completely exclude early shingles or renal colic, so therefore gave her precautions for all  Right lower quadrant pain has tenderness at McBurney's point, but no acute abdomen, and reviewed progression of appendicitis to look out for as well  Handouts given on low-fat diet, proceed to ER if severe pain associated with nausea, vomiting, fevers  We will check labs and ultrasound as above  Recommend rapid release Tylenol and famotidine as needed  Follow-up to establish with new PCP      Verbal and written instructions (see AVS) provided. Patient expresses understanding of diagnosis and treatment plan. Follow-up and Dispositions    Return in about 6 weeks (around 3/27/2023) for establish with Amy Guy NP.        Future Appointments   Date Time Provider Júnior Haro   1/26/2024  8:40 AM Migdalia Tee MD [facility] AM[facility]

## 2023-02-14 LAB
ALBUMIN SERPL-MCNC: 4.2 G/DL (ref 3.5–5)
ALBUMIN/GLOB SERPL: 1.3 (ref 1.1–2.2)
ALP SERPL-CCNC: 89 U/L (ref 45–117)
ALT SERPL-CCNC: 20 U/L (ref 12–78)
AMORPH CRY URNS QL MICRO: ABNORMAL
AMYLASE SERPL-CCNC: 58 U/L (ref 25–115)
ANION GAP SERPL CALC-SCNC: 4 MMOL/L (ref 5–15)
APPEARANCE UR: ABNORMAL
AST SERPL-CCNC: 17 U/L (ref 15–37)
BACTERIA URNS QL MICRO: NEGATIVE /HPF
BASOPHILS # BLD: 0.1 K/UL (ref 0–0.1)
BASOPHILS NFR BLD: 1 % (ref 0–1)
BILIRUB DIRECT SERPL-MCNC: 0.1 MG/DL (ref 0–0.2)
BILIRUB SERPL-MCNC: 0.5 MG/DL (ref 0.2–1)
BILIRUB UR QL: NEGATIVE
BUN SERPL-MCNC: 16 MG/DL (ref 6–20)
BUN/CREAT SERPL: 22 (ref 12–20)
CALCIUM SERPL-MCNC: 9.9 MG/DL (ref 8.5–10.1)
CHLORIDE SERPL-SCNC: 107 MMOL/L (ref 97–108)
CO2 SERPL-SCNC: 27 MMOL/L (ref 21–32)
COLOR UR: ABNORMAL
CREAT SERPL-MCNC: 0.74 MG/DL (ref 0.55–1.02)
DIFFERENTIAL METHOD BLD: NORMAL
EOSINOPHIL # BLD: 0 K/UL (ref 0–0.4)
EOSINOPHIL NFR BLD: 1 % (ref 0–7)
EPITH CASTS URNS QL MICRO: ABNORMAL /LPF
ERYTHROCYTE [DISTWIDTH] IN BLOOD BY AUTOMATED COUNT: 13.2 % (ref 11.5–14.5)
GLOBULIN SER CALC-MCNC: 3.2 G/DL (ref 2–4)
GLUCOSE SERPL-MCNC: 95 MG/DL (ref 65–100)
GLUCOSE UR STRIP.AUTO-MCNC: NEGATIVE MG/DL
HCT VFR BLD AUTO: 44.4 % (ref 35–47)
HGB BLD-MCNC: 14 G/DL (ref 11.5–16)
HGB UR QL STRIP: NEGATIVE
IMM GRANULOCYTES # BLD AUTO: 0 K/UL (ref 0–0.04)
IMM GRANULOCYTES NFR BLD AUTO: 0 % (ref 0–0.5)
KETONES UR QL STRIP.AUTO: NEGATIVE MG/DL
LEUKOCYTE ESTERASE UR QL STRIP.AUTO: NEGATIVE
LIPASE SERPL-CCNC: 104 U/L (ref 73–393)
LYMPHOCYTES # BLD: 2.5 K/UL (ref 0.8–3.5)
LYMPHOCYTES NFR BLD: 32 % (ref 12–49)
MCH RBC QN AUTO: 29.4 PG (ref 26–34)
MCHC RBC AUTO-ENTMCNC: 31.5 G/DL (ref 30–36.5)
MCV RBC AUTO: 93.1 FL (ref 80–99)
MONOCYTES # BLD: 0.5 K/UL (ref 0–1)
MONOCYTES NFR BLD: 6 % (ref 5–13)
MUCOUS THREADS URNS QL MICRO: ABNORMAL /LPF
NEUTS SEG # BLD: 4.9 K/UL (ref 1.8–8)
NEUTS SEG NFR BLD: 60 % (ref 32–75)
NITRITE UR QL STRIP.AUTO: NEGATIVE
NRBC # BLD: 0 K/UL (ref 0–0.01)
NRBC BLD-RTO: 0 PER 100 WBC
PH UR STRIP: 7.5 (ref 5–8)
PLATELET # BLD AUTO: 382 K/UL (ref 150–400)
PMV BLD AUTO: 9.9 FL (ref 8.9–12.9)
POTASSIUM SERPL-SCNC: 4.4 MMOL/L (ref 3.5–5.1)
PROT SERPL-MCNC: 7.4 G/DL (ref 6.4–8.2)
PROT UR STRIP-MCNC: NEGATIVE MG/DL
RBC # BLD AUTO: 4.77 M/UL (ref 3.8–5.2)
RBC #/AREA URNS HPF: ABNORMAL /HPF (ref 0–5)
SODIUM SERPL-SCNC: 138 MMOL/L (ref 136–145)
SP GR UR REFRACTOMETRY: 1.02 (ref 1–1.03)
UA: UC IF INDICATED,UAUC: ABNORMAL
UROBILINOGEN UR QL STRIP.AUTO: 0.2 EU/DL (ref 0.2–1)
WBC # BLD AUTO: 8 K/UL (ref 3.6–11)
WBC URNS QL MICRO: ABNORMAL /HPF (ref 0–4)

## 2023-02-16 ENCOUNTER — HOSPITAL ENCOUNTER (OUTPATIENT)
Dept: ULTRASOUND IMAGING | Age: 48
Discharge: HOME | End: 2023-02-16
Payer: COMMERCIAL

## 2023-02-16 DIAGNOSIS — R10.11 COLICKY RUQ ABDOMINAL PAIN: ICD-10-CM

## 2023-02-16 DIAGNOSIS — R10.9 RIGHT FLANK PAIN: ICD-10-CM

## 2023-02-16 DIAGNOSIS — R10.31 RLQ ABDOMINAL PAIN: ICD-10-CM

## 2023-02-16 DIAGNOSIS — K80.50 BILIARY COLIC SYMPTOM: ICD-10-CM

## 2023-02-16 PROCEDURE — 76700 US EXAM ABDOM COMPLETE: CPT

## 2023-02-17 NOTE — PROGRESS NOTES
ICD-10-CM ICD-9-CM    1. Routine adult health maintenance  Z00.00 V70.0       2. Hypercholesterolemia  E78.00 272.0 LIPID PANEL      3. Anxiety associated with depression  F41.8 300.4       4. Screen for colon cancer  Z12.11 V76.51 REFERRAL FOR COLONOSCOPY      5. Encounter for immunization  Z23 V03.89       6. Protein C deficiency ([facility]  D68.59 289.81       7. MTHFR mutation  Z15.89 V84.89       8. Prothrombin gene mutation [facility])  D68.52 289.81                Subjective:     Chief Complaint   Patient presents with    Physical       Gabriela Freeman is a 49 y.o. F.  she  has a past medical history of Anxiety associated with depression (12/13/2017), Fibrocystic breast, History of abnormal cervical Pap smear (1993), History of lipoma, History of MTHFR mutation, Hypercholesterolemia, Obesity, Protein C deficiency ([facility] (12/13/2017), [facility] ([facility]), and Recurrent cold sores (12/13/2017). her last appointment in this clinic was 2/13/2023 . I reviewed and updated the medical record. This patient was seen most recently by [facility]. She is a prior patient of Dr. Sonal Caicedo. At her last appointment with [facility] in mid February, she had noticed tightness in her back, but was otherwise feeling generally fine. Physical examination at the time had been notable for obesity with a BMI of 32 kg/m². For her complaint of colicky right upper quadrant abdominal pain, an ultrasound was ordered, and she was referred for routine laboratory studies. Biliary colic had been suspected. The ultrasound ended up showing no evidence of cholelithiasis, and no other evidence of common bile duct pathology. Visualized pancreas was normal.    Today, the patient comes in mainly for establishment and follow-up on her chronic medical concerns. Since her last visit, there have been no significant clinical changes; the abdominal discomfort she had been having previously has completely resolved.   Over the past year, there have been no other significant clinical changes. She continues on sertraline 100 mg daily for anxiety and depression, as she has been on for many years. She denies having had any recent suicidal or homicidal ideation, and overall feels that her mood is well controlled with this medication. She has tried to reduce the dose of the medication in the past, but was unable to do so secondary to worsening anxiety/depression symptoms. She continues on rosuvastatin for her history of hypercholesterolemia. She denies any personal history of cardiovascular or cerebrovascular disease. She denies having had any myalgias or GI symptoms with the use of this medication. Previously, she was noted to have high cholesterol, with LDL over 150 mg/dL; she denies any family history of cardiovascular or cerebrovascular disease. She has a history of MTHFR mutation as well as protein C deficiency and prothrombin gene mutation. She continues on baby aspirin for her history of this hypercoagulable disorder. She denies any recent chest pain, shortness breath, or any further cardiopulmonary concerns. She is due for routine screening colonoscopy. She has her routine mammography and Pap smears done with her gynecologist on a yearly basis. Her review of systems is otherwise negative. Routine Healthcare Maintenance issues are reviewed and discussed with the patient as noted below. Orders to update gaps in healthcare maintenance were placed as noted below in the Assessment and Plan, where applicable. 10-year Cardiovascular Risk Orpajaylen Gray, 2013):   The 10-year ASCVD risk score (Ilsa STOCK, et al., 2019) is: 0.5%    Values used to calculate the score:      Age: 49 years      Sex: Female      Is Non- : No      Diabetic: No      Tobacco smoker: No      Systolic Blood Pressure: 524 mmHg      Is BP treated: No      HDL Cholesterol: 79 MG/DL      Total Cholesterol: 188 MG/DL     Past Medical History:  Past Medical History:   Diagnosis Date    Anxiety associated with depression 12/13/2017    Fibrocystic breast     History of abnormal cervical Pap smear 1993    HPV and colpo wnl fu    History of lipoma     forehead and shoulder removed in 2008    History of MTHFR mutation     Hypercholesterolemia     RX = rosuvastatin    Obesity     Protein C deficiency ([facility] 12/13/2017    RX = ASA 81 mg/d, Fish Oil    Prothrombin gene mutation ([facility] 2004    part of hypercoag workup; RX = ASA 81 mg/d, Fish Oil    Recurrent cold sores 12/13/2017       Past Surgical Histor:  Past Surgical History:   Procedure Laterality Date    HX BREAST BIOPSY      HX COLPOSCOPY  1993    HX OTHER SURGICAL      facial surgery    HX SHOULDER ARTHROSCOPY  2008    lump removed- lipoma    HX TONSILLECTOMY         Allergies: Allergies   Allergen Reactions    Aamir Hives    Penicillins Unknown (comments)       Medications:  Current Outpatient Medications   Medication Sig Dispense Refill    acyclovir (ZOVIRAX) 400 mg tablet TAKE 1 TABLET BY MOUTH FOUR TIMES DAILY 25 Tablet 0    rosuvastatin (CRESTOR) 5 mg tablet TAKE 1 TABLET BY MOUTH DAILY 90 Tablet 1    sertraline (ZOLOFT) 100 mg tablet TAKE 1 TABLET BY MOUTH DAILY 90 Tablet 1    folic acid-vit J3-BWF X01 ([facility]) 2.2-25-1 mg tab Take 1 Tablet by mouth daily for 360 days. 90 Tablet 3    acyclovir (ZOVIRAX) 5 % ointment Apply  to affected area every three (3) hours. 2 g 0    multivitamin (ONE A DAY) tablet Take 1 Tab by mouth daily. VITAMIN E ACETATE (VITAMIN E PO) Take  by mouth. aspirin delayed-release 81 mg tablet Take  by mouth daily. CALCIUM CARBONATE (CALCIUM 500 PO) Take  by mouth. DOCOSAHEXANOIC ACID/EPA (FISH OIL PO) Take  by mouth.          Social History:  Social History     Socioeconomic History    Marital status:     Number of children: 2   Occupational History    Occupation:    Tobacco Use    Smoking status: Never    Smokeless tobacco: Never   Vaping Use    Vaping Use: Never used   Substance and Sexual Activity    Alcohol use: Yes     Comment: socially    Drug use: No    Sexual activity: Yes     Partners: Male     Birth control/protection: Surgical     Comment: Vasectomy       Family History:  Family History   Problem Relation Age of Onset    Hypertension Father     High Cholesterol Father     Breast Cancer Maternal Aunt     Breast Cancer Paternal Aunt     Stroke Paternal Grandmother        Immunizations:  Immunization History   Administered Date(s) Administered    COVID-19, MODERNA BLUE border, Primary or Immunocompromised, (age 18y+), IM, 100 mcg/0.5mL 03/30/2021, 04/27/2021    COVID-19, MODERNA Booster BLUE border, (age 18y+), IM, 50mcg/0.25mL 11/27/2021, 10/27/2022    Influenza Vaccine 10/27/2022    Tdap 05/03/2019        Healthcare Maintenance:  Health Maintenance   Topic Date Due    Colorectal Cancer Screening Combo  Never done    Depression Monitoring  07/27/2022    Lipid Screen  09/13/2022    COVID-19 Vaccine (5 - Booster for Moderna series) 12/22/2022    Breast Cancer Screen Mammogram  01/25/2025    Cervical cancer screen  01/20/2026    DTaP/Tdap/Td series (2 - Td or Tdap) 05/03/2029    Hepatitis C Screening  Completed    Flu Vaccine  Completed    Pneumococcal 0-64 years  Aged Out        Review of Systems:  ROS:  Review of Systems   Constitutional: Negative. HENT: Negative. Eyes: Negative. Respiratory: Negative. Cardiovascular: Negative. Gastrointestinal: Negative. Genitourinary: Negative. Musculoskeletal: Negative. Skin: Negative. Neurological: Negative. Endo/Heme/Allergies: Negative. Psychiatric/Behavioral: Negative.       ROS otherwise negative      Objective:     Vital Signs:  Visit Vitals  /82 (BP 1 Location: Right arm, BP Patient Position: Sitting, BP Cuff Size: Large adult)   Pulse 62   Resp 18   Ht 5' 5.5\" (1.664 m)   Wt 197 lb 11.2 oz (89.7 kg)   SpO2 99%   BMI 32.40 kg/m² BMI:  Body mass index is 32.4 kg/m². Physical Examination:  Physical Exam  Constitutional:       Appearance: Normal appearance. She is obese. HENT:      Head: Normocephalic and atraumatic. Right Ear: External ear normal.      Left Ear: External ear normal.      Nose: Nose normal.      Mouth/Throat:      Mouth: Mucous membranes are moist.      Pharynx: Oropharynx is clear. No oropharyngeal exudate or posterior oropharyngeal erythema. Cardiovascular:      Rate and Rhythm: Normal rate and regular rhythm. Pulses: Normal pulses. Heart sounds: Normal heart sounds. No murmur heard. No friction rub. No gallop. Pulmonary:      Effort: Pulmonary effort is normal. No respiratory distress. Breath sounds: Normal breath sounds. No wheezing, rhonchi or rales. Abdominal:      General: Abdomen is flat. Bowel sounds are normal. There is no distension. Palpations: Abdomen is soft. Tenderness: There is no abdominal tenderness. There is no guarding. Musculoskeletal:         General: No swelling, tenderness or deformity. Normal range of motion. Cervical back: Normal range of motion and neck supple. No rigidity or tenderness. Skin:     General: Skin is warm and dry. Findings: No erythema, lesion or rash. Neurological:      General: No focal deficit present. Mental Status: She is alert and oriented to person, place, and time. Mental status is at baseline. Sensory: No sensory deficit. Motor: No weakness.       Gait: Gait normal.      Deep Tendon Reflexes: Reflexes normal.   Psychiatric:         Mood and Affect: Mood normal.         Behavior: Behavior normal.         Judgment: Judgment normal.        Physical exam otherwise negative    Diagnostic Testing:    Laboratory Studies:  Office Visit on 02/13/2023   Component Date Value Ref Range Status    Protein, total 02/13/2023 7.4  6.4 - 8.2 g/dL Final    Albumin 02/13/2023 4.2  3.5 - 5.0 g/dL Final    Globulin 02/13/2023 3.2  2.0 - 4.0 g/dL Final    A-G Ratio 02/13/2023 1.3  1.1 - 2.2   Final    Bilirubin, total 02/13/2023 0.5  0.2 - 1.0 MG/DL Final    Bilirubin, direct 02/13/2023 0.1  0.0 - 0.2 MG/DL Final    Alk. phosphatase 02/13/2023 89  45 - 117 U/L Final    AST (SGOT) 02/13/2023 17  15 - 37 U/L Final    ALT (SGPT) 02/13/2023 20  12 - 78 U/L Final    Sodium 02/13/2023 138  136 - 145 mmol/L Final    Potassium 02/13/2023 4.4  3.5 - 5.1 mmol/L Final    Chloride 02/13/2023 107  97 - 108 mmol/L Final    CO2 02/13/2023 27  21 - 32 mmol/L Final    Anion gap 02/13/2023 4 (A)  5 - 15 mmol/L Final    Glucose 02/13/2023 95  65 - 100 mg/dL Final    BUN 02/13/2023 16  6 - 20 MG/DL Final    Creatinine 02/13/2023 0.74  0.55 - 1.02 MG/DL Final    BUN/Creatinine ratio 02/13/2023 22 (A)  12 - 20   Final    eGFR 02/13/2023 >60  >60 ml/min/1.73m2 Final    Comment:   Pediatric calculator link: [email or web address] org/[email or web address]/[email or web address]_[email or web address]    These results are not intended for use in patients <25years of age. eGFR results are calculated without a race factor using  the 2021 CKD-EPI equation. Careful clinical correlation is recommended, particularly when comparing to results calculated using previous equations. The CKD-EPI equation is less accurate in patients with extremes of muscle mass, extra-renal metabolism of creatinine, excessive creatine ingestion, or following therapy that affects renal tubular secretion.       Calcium 02/13/2023 9.9  8.5 - 10.1 MG/DL Final    Lipase 02/13/2023 104  73 - 393 U/L Final    Amylase 02/13/2023 58  25 - 115 U/L Final    Color 02/13/2023 YELLOW/STRAW    Final    Color Reference Range: Straw, Yellow or Dark Yellow    Appearance 02/13/2023 CLOUDY (A)  CLEAR   Final    Specific gravity 02/13/2023 1.024  1.003 - 1.030   Final    pH (UA) 02/13/2023 7.5  5.0 - 8.0   Final    Protein 02/13/2023 Negative  Negative mg/dL Final    Glucose 02/13/2023 Negative  Negative mg/dL Final    Ketone 02/13/2023 Negative  Negative mg/dL Final    Bilirubin 02/13/2023 Negative  Negative   Final    Blood 02/13/2023 Negative  Negative   Final    Urobilinogen 02/13/2023 0.2  0.2 - 1.0 EU/dL Final    Nitrites 02/13/2023 Negative  Negative   Final    Leukocyte Esterase 02/13/2023 Negative  Negative   Final    WBC 02/13/2023 0-4  0 - 4 /hpf Final    RBC 02/13/2023 0-5  0 - 5 /hpf Final    Epithelial cells 02/13/2023 FEW  FEW /lpf Final    Epithelial cell category consists of squamous cells and /or transitional urothelial cells. Renal tubular cells, if present, are separately identified as such. Bacteria 02/13/2023 Negative  Negative /hpf Final    UA:UC IF INDICATED 02/13/2023 CULTURE NOT INDICATED BY UA RESULT  CULTURE NOT INDICATED BY UA RESULT   Final    Mucus 02/13/2023 TRACE (A)  Negative /lpf Final    Amorphous Crystals 02/13/2023 FEW (A)  Negative   Final    WBC 02/13/2023 8.0  3.6 - 11.0 K/uL Final    RBC 02/13/2023 4.77  3.80 - 5.20 M/uL Final    HGB 02/13/2023 14.0  11.5 - 16.0 g/dL Final    HCT 02/13/2023 44.4  35.0 - 47.0 % Final    MCV 02/13/2023 93.1  80.0 - 99.0 FL Final    MCH 02/13/2023 29.4  26.0 - 34.0 PG Final    MCHC 02/13/2023 31.5  30.0 - 36.5 g/dL Final    RDW 02/13/2023 13.2  11.5 - 14.5 % Final    PLATELET 73/23/1500 652  150 - 400 K/uL Final    MPV 02/13/2023 9.9  8.9 - 12.9 FL Final    NRBC 02/13/2023 0.0  0  WBC Final    ABSOLUTE NRBC 02/13/2023 0.00  0.00 - 0.01 K/uL Final    NEUTROPHILS 02/13/2023 60  32 - 75 % Final    LYMPHOCYTES 02/13/2023 32  12 - 49 % Final    MONOCYTES 02/13/2023 6  5 - 13 % Final    EOSINOPHILS 02/13/2023 1  0 - 7 % Final    BASOPHILS 02/13/2023 1  0 - 1 % Final    IMMATURE GRANULOCYTES 02/13/2023 0  0.0 - 0.5 % Final    ABS. NEUTROPHILS 02/13/2023 4.9  1.8 - 8.0 K/UL Final    ABS. LYMPHOCYTES 02/13/2023 2.5  0.8 - 3.5 K/UL Final    ABS. MONOCYTES 02/13/2023 0.5  0.0 - 1.0 K/UL Final    ABS. EOSINOPHILS 02/13/2023 0.0  0.0 - 0.4 K/UL Final    ABS.  BASOPHILS 02/13/2023 0.1  0.0 - 0.1 K/UL Final    ABS. IMM. GRANS. 02/13/2023 0.0  0.00 - 0.04 K/UL Final    DF 02/13/2023 AUTOMATED    Final         Radiographic Studies:  XR Results (most recent):  No results found for this or any previous visit. YESSICA Results (most recent):  Results from [place] encounter on 01/25/23    YESSICA 3D JOSELUIS W MAMMO BI SCREENING INCL CAD    Narrative  STUDY: Bilateral digital screening mammogram with 3-D tomosynthesis    INDICATION:  Screening. COMPARISON: 2022-2017    BREAST COMPOSITION: The breasts are heterogeneously dense, which may obscure  small masses. FINDINGS: Bilateral digital screening mammography was performed and is  interpreted in conjunction with a computer assisted detection (CAD) system. Additionally, tomosynthesis of both breasts in the CC and MLO projections was  performed. No suspicious masses or calcifications are identified. There has been  no significant change. Impression  BI-RADS 1: Negative. No mammographic evidence of malignancy. RECOMMENDATIONS:  Next screening mammogram is recommended in one year. The patient will be notified of these results. CT Results (most recent):  No results found for this or any previous visit. DEXA Results (most recent):  No results found for this or any previous visit. MRI Results (most recent):  No results found for this or any previous visit. Assessment/Plan:       ICD-10-CM ICD-9-CM    1. Routine adult health maintenance  Z00.00 V70.0       2. Hypercholesterolemia  E78.00 272.0 LIPID PANEL      3. Anxiety associated with depression  F41.8 300.4       4. Screen for colon cancer  Z12.11 V76.51 REFERRAL FOR COLONOSCOPY      5. Encounter for immunization  Z23 V03.89       6. Protein C deficiency ([facility]  D68.59 289.81       7. MTHFR mutation  Z15.89 V84.89       8.  Prothrombin gene mutation ([facility]  D68.52 289.81              Protein C deficiency/prothrombin gene mutation/MTHFR mutation:  - On aspirin 81 mg daily for this; no recent symptoms related to pulmonary emboli or DVT, no history of other hypercoagulable disorders otherwise. Continuing with aspirin at current dosing. Healthcare Maintenance:  - Preventive measures are reviewed as per above  - Up to date on routine interventions except as noted above  - Orders placed to update gaps as noted  - Notes: Routine colonoscopy ordered, fasting lipid panel ordered, otherwise up-to-date    Anxiety/Depression:   - Current PHQ: No data recorded    - Current RX:   Key Psychotherapeutic Meds               sertraline (ZOLOFT) 100 mg tablet (Taking) TAKE 1 TABLET BY MOUTH DAILY           Key Neurological Meds               aspirin delayed-release 81 mg tablet (Taking) Take  by mouth daily.           - Psychology/Psychiatry Co-managing? No   - Asymptomatic without red flag symptoms; continuing with current care    Hyperlipidemia/Dyslipidemia:   - Summary of Cardiovascular Risks and Goals:     LDL goal is under 100  hyperlipidemia  obese  [place] 10-year risk <10%   -   Lab Results   Component Value Date/Time    LDL, calculated 95.8 09/13/2021 09:31 AM    HDL Cholesterol 79 09/13/2021 09:31 AM       - Relevant Cholesterol Meds:  Key Antihyperlipidemia Meds               rosuvastatin (CRESTOR) 5 mg tablet (Taking) TAKE 1 TABLET BY MOUTH DAILY    DOCOSAHEXANOIC ACID/EPA (FISH OIL PO) (Taking) Take  by mouth. - Cholesterol at target: yes   - Does patient meet USPSTF and ACC/AHA indications for pharmacotherapy (e.g., statin): yes   - GI symptoms with meds: NO   - Muscle aches with meds: NO   - Other Adverse effects with meds: NO   - Medication Plan: continue   - Notes: CCM          I have reviewed the patient's medical history in detail and updated the computerized patient record. We had a prolonged discussion about these complex clinical issues and went over the various important aspects to consider. All questions were answered.       Advised the patient to call back or return to office if symptoms do not improve, change in nature, or persist.     The patient was given an after visit summary or informed of [employer] which includes patient instructions, diagnoses, current medications, & vitals. she expressed understanding with the diagnosis and plan. Carolina Hassan MD    Please note that this dictation was completed with [employer], the computer voice recognition software. Quite often unanticipated grammatical, syntax, homophones, and other interpretive errors are inadvertently transcribed by the computer software. Please disregard these errors. Please excuse any errors that have escaped final proofreading.

## 2023-02-17 NOTE — PROGRESS NOTES
Grey higuerag sent  The ultrasound looks good, normal kidney and gall bladder. This would suggest a muscular cause for your pain. I would take an anti-inflammatory medicine such as either ibuprofen (Advil) or naproxen (Aleve) in over-the-counter doses on a regular dosing schedule until your follow up next week. Take a once daily famotidine (Pepcid) to protect your stomach while taking the anti-inflammatory.

## 2023-02-21 ENCOUNTER — OFFICE VISIT (OUTPATIENT)
Dept: INTERNAL MEDICINE CLINIC | Age: 48
End: 2023-02-21
Payer: COMMERCIAL

## 2023-02-21 VITALS
HEIGHT: 66 IN | OXYGEN SATURATION: 99 % | DIASTOLIC BLOOD PRESSURE: 82 MMHG | RESPIRATION RATE: 18 BRPM | HEART RATE: 62 BPM | WEIGHT: 197.7 LBS | SYSTOLIC BLOOD PRESSURE: 122 MMHG | BODY MASS INDEX: 31.77 KG/M2

## 2023-02-21 DIAGNOSIS — F41.8 ANXIETY ASSOCIATED WITH DEPRESSION: ICD-10-CM

## 2023-02-21 DIAGNOSIS — Z23 ENCOUNTER FOR IMMUNIZATION: ICD-10-CM

## 2023-02-21 DIAGNOSIS — D68.59 PROTEIN C DEFICIENCY (HCC): ICD-10-CM

## 2023-02-21 DIAGNOSIS — E78.00 HYPERCHOLESTEROLEMIA: ICD-10-CM

## 2023-02-21 DIAGNOSIS — D68.52 PROTHROMBIN GENE MUTATION (HCC): ICD-10-CM

## 2023-02-21 DIAGNOSIS — Z12.11 SCREEN FOR COLON CANCER: ICD-10-CM

## 2023-02-21 DIAGNOSIS — Z00.00 ROUTINE ADULT HEALTH MAINTENANCE: Primary | ICD-10-CM

## 2023-02-21 DIAGNOSIS — Z15.89 MTHFR MUTATION: ICD-10-CM

## 2023-02-21 PROCEDURE — 99396 PREV VISIT EST AGE 40-64: CPT | Performed by: INTERNAL MEDICINE

## 2023-02-21 PROCEDURE — 99214 OFFICE O/P EST MOD 30 MIN: CPT | Performed by: INTERNAL MEDICINE

## 2023-02-21 NOTE — PROGRESS NOTES
Chief Complaint   Patient presents with    Physical     Visit Vitals  /82 (BP 1 Location: Right arm, BP Patient Position: Sitting, BP Cuff Size: Large adult)   Pulse 62   Resp 18   Ht 5' 5.5\" (1.664 m)   Wt 197 lb 11.2 oz (89.7 kg)   SpO2 99%   BMI 32.40 kg/m²       1. \"Have you been to the ER, urgent care clinic since your last visit? Hospitalized since your last visit? \" No    2. \"Have you seen or consulted any other health care providers outside of the [address] [place] [address] since your last visit? \" No     3. For patients aged 39-70: Has the patient had a colonoscopy / FIT/ Cologuard? No      If the patient is female:    4. For patients aged 41-77: Has the patient had a mammogram within the past 2 years? No      5. For patients aged 21-65: Has the patient had a pap smear?  No

## 2023-02-21 NOTE — PATIENT INSTRUCTIONS
Learning About the [facility] Diet  What is the Mediterranean diet? The Mediterranean diet is a style of eating rather than a diet plan. It features foods eaten in [place], [place], [place], and other countries along the [facility]. It emphasizes eating foods like fish, fruits, vegetables, beans, high-fiber breads and whole grains, nuts, and olive oil. This style of eating includes limited red meat, cheese, and sweets. Why choose the Mediterranean diet? A Mediterranean-style diet may improve heart health. It contains more fat than other heart-healthy diets. But the fats are mainly from nuts, unsaturated oils (such as fish oils and olive oil), and certain nut or seed oils (such as canola, soybean, or flaxseed oil). These fats may help protect the heart and blood vessels. How can you get started on the Mediterranean diet? Here are some things you can do to switch to a more Mediterranean way of eating. What to eat  Eat a variety of fruits and vegetables each day, such as grapes, blueberries, tomatoes, broccoli, peppers, figs, olives, spinach, eggplant, beans, lentils, and chickpeas. Eat a variety of whole-grain foods each day, such as oats, brown rice, and whole wheat bread, pasta, and couscous. Eat fish at least 2 times a week. Try tuna, salmon, mackerel, lake trout, herring, or sardines. Eat moderate amounts of low-fat dairy products, such as milk, cheese, or yogurt. Eat moderate amounts of poultry and eggs. Choose healthy (unsaturated) fats, such as nuts, olive oil, and certain nut or seed oils like canola, soybean, and flaxseed. Limit unhealthy (saturated) fats, such as butter, palm oil, and coconut oil. And limit fats found in animal products, such as meat and dairy products made with whole milk. Try to eat red meat only a few times a month in very small amounts. Limit sweets and desserts to only a few times a week. This includes sugar-sweetened drinks like soda.   The Mediterranean diet may also include red wine with your meal--1 glass each day for women and up to 2 glasses a day for men. Tips for eating at home  Use herbs, spices, garlic, lemon zest, and citrus juice instead of salt to add flavor to foods. Add avocado slices to your sandwich instead of tobar. Have fish for lunch or dinner instead of red meat. Brush the fish with olive oil, and broil or grill it. Sprinkle your salad with seeds or nuts instead of cheese. Cook with olive or canola oil instead of butter or oils that are high in saturated fat. Switch from 2% milk or whole milk to 1% or fat-free milk. Dip raw vegetables in a vinaigrette dressing or hummus instead of dips made from mayonnaise or sour cream.  Have a piece of fruit for dessert instead of a piece of cake. Try baked apples, or have some dried fruit. Tips for eating out  Try broiled, grilled, baked, or poached fish instead of having it fried or breaded. Ask your  to have your meals prepared with olive oil instead of butter. Order dishes made with marinara sauce or sauces made from olive oil. Avoid sauces made from cream or mayonnaise. Choose whole-grain breads, whole wheat pasta and pizza crust, brown rice, beans, and lentils. Cut back on butter or margarine on bread. Instead, you can dip your bread in a small amount of olive oil. Ask for a side salad or grilled vegetables instead of french fries or chips. Where can you learn more? Go to http://www.johnson.com/  Enter O407 in the search box to learn more about \"Learning About the Mediterranean Diet. \"  Current as of: May 9, 2022               Content Version: 13.4  © 3636-6785 [employer], Incorporated. Care instructions adapted under license by [employer] (which disclaims liability or warranty for this information).  If you have questions about a medical condition or this instruction, always ask your healthcare professional. Seth Kendall disclaims any warranty or liability for your use of this information. Semaglutide: Patient drug information      Copyright 3005-8369 [facility] 240 rights reserved. Contributor Disclosures  (For additional information see \"Semaglutide: Drug information\")    You must carefully read the \"Consumer Information Use and Disclaimer\" below in order to understand and correctly use this information. Brand Names: [place]  Ozempic (0.25 or 0.5 MG/DOSE); Ozempic (1 MG/DOSE); Ozempic (2 MG/DOSE); Rybelsus; [id number]    Brand Names: [place] ([facility])  Ozempic (0.25 or 0.5 MG/DOSE); Ozempic (1 MG/DOSE); Rybelsus    Warning   This drug has been shown to cause thyroid cancer in some animals. It is not known if this happens in humans. If thyroid cancer happens, it may be deadly if not found and treated early. Call your doctor right away if you have a neck mass, trouble breathing, trouble swallowing, or have hoarseness that will not go away. Do not use this drug if you have a health problem called Multiple Endocrine Neoplasia syndrome type 2 (MEN 2), or if you or a family member have had thyroid cancer. What is this drug used for? Ozempic prefilled pens and Rybelsus tablets: It is used to lower blood sugar in patients with high blood sugar (diabetes). Ozempic prefilled pens: It is used to lower the chance of heart attack, stroke, and death in some people. Wegovy prefilled pens: It is used to help with weight loss in certain people. What do I need to tell my doctor BEFORE I take this drug? For all uses of this drug:   If you are allergic to this drug; any part of this drug; or any other drugs, foods, or substances. Tell your doctor about the allergy and what signs you had. If you have ever had pancreatitis. If you have or have ever had depression or thoughts of suicide. If you are using another drug that has the same drug in it. If you are using another drug like this one.  If you are not sure, ask your doctor or pharmacist.   If using for high blood sugar: If you have type 1 diabetes. Do not use this drug to treat type 1 diabetes. This is not a list of all drugs or health problems that interact with this drug. Tell your doctor and pharmacist about all of your drugs (prescription or OTC, natural products, vitamins) and health problems. You must check to make sure that it is safe for you to take this drug with all of your drugs and health problems. Do not start, stop, or change the dose of any drug without checking with your doctor. What are some things I need to know or do while I take this drug? For all uses of this drug:   Tell all of your health care providers that you take this drug. This includes your doctors, nurses, pharmacists, and dentists. Follow the diet and workout plan that your doctor told you about. Have blood work checked as you have been told by the doctor. Talk with the doctor. Talk with your doctor before you drink alcohol. Kidney problems have happened. Sometimes, these may need to be treated in the hospital or with dialysis. If you cannot drink liquids by mouth or if you have upset stomach, throwing up, or diarrhea that does not go away; you need to avoid getting dehydrated. Contact your doctor to find out what to do. Dehydration may lead to new or worse kidney problems. Do not share pen or cartridge devices with another person even if the needle has been changed. Sharing these devices may pass infections from one person to another. This includes infections you may not know you have. If you are planning on getting pregnant, talk with your doctor. You may need to stop taking this drug at least 2 months before getting pregnant. If using for high blood sugar:   Wear disease medical alert ID (identification). Check your blood sugar as you have been told by your doctor. Do not drive if your blood sugar has been low. There is a greater chance of you having a crash.    It may be harder to control blood sugar during times of stress such as fever, infection, injury, or surgery. A change in physical activity, exercise, or diet may also affect blood sugar. Tell your doctor if you are pregnant, plan on getting pregnant, or are breast-feeding. You will need to talk about the benefits and risks to you and the baby. If using for weight loss: If you have high blood sugar (diabetes), you will need to watch your blood sugar closely. Weight loss during pregnancy may cause harm to the unborn baby. If you get pregnant while taking this drug or if you want to get pregnant, call your doctor right away. Tell your doctor if you are breast-feeding. You will need to talk about any risks to your baby. What are some side effects that I need to call my doctor about right away? WARNING/CAUTION: Even though it may be rare, some people may have very bad and sometimes deadly side effects when taking a drug. Tell your doctor or get medical help right away if you have any of the following signs or symptoms that may be related to a very bad side effect:   For all uses of this drug:   Signs of an allergic reaction, like rash; hives; itching; red, swollen, blistered, or peeling skin with or without fever; wheezing; tightness in the chest or throat; trouble breathing, swallowing, or talking; unusual hoarseness; or swelling of the mouth, face, lips, tongue, or throat. Signs of kidney problems like unable to pass urine, change in how much urine is passed, blood in the urine, or a big weight gain. Signs of gallbladder problems like pain in the upper right belly area, right shoulder area, or between the shoulder blades; change in stools; dark urine or yellow skin or eyes; or fever with chills. Very bad dizziness or passing out. A fast heartbeat. Change in eyesight. Low blood sugar can happen. The chance may be raised when this drug is used with other drugs for diabetes.  Signs may be dizziness, headache, feeling sleepy or weak, shaking, fast heartbeat, confusion, hunger, or sweating. Call your doctor right away if you have any of these signs. Follow what you have been told to do for low blood sugar. This may include taking glucose tablets, liquid glucose, or some fruit juices. Severe and sometimes deadly pancreas problems (pancreatitis) have happened with this drug. Call your doctor right away if you have severe stomach pain, severe back pain, or severe upset stomach or throwing up. If using for weight loss:   New or worse behavior or mood changes like depression or thoughts of suicide. What are some other side effects of this drug? All drugs may cause side effects. However, many people have no side effects or only have minor side effects. Call your doctor or get medical help if any of these side effects or any other side effects bother you or do not go away: If using for high blood sugar:   Constipation, diarrhea, stomach pain, upset stomach, or throwing up. Tablets:   Not hungry. If using for weight loss:   Constipation, diarrhea, stomach pain, upset stomach, or throwing up. Headache. Feeling dizzy, tired, or weak. Burping. Gas. These are not all of the side effects that may occur. If you have questions about side effects, call your doctor. Call your doctor for medical advice about side effects. You may report side effects to your national health agency. How is this drug best taken? Use this drug as ordered by your doctor. Read all information given to you. Follow all instructions closely. Tablets: Take at least 30 minutes before the first food, drink, or drugs of the day. Take with plain water only. Do not take with more than 4 ounces (120 mL) of water. Swallow whole. Do not chew, break, or crush. Keep taking this drug as you have been told by your doctor or other health care provider, even if you feel well. Prefilled syringes or pen:    It is given as a shot into the fatty part of the skin on the top of the thigh, belly area, or upper arm. If you will be giving yourself the shot, your doctor or nurse will teach you how to give the shot. Take with or without food. Take the same day each week. Move the site where you give the shot with each shot. Do not use if the solution is cloudy, leaking, or has particles. Do not use if solution changes color. Wash your hands before and after use. Keep taking this drug as you have been told by your doctor or other health care provider, even if you feel well. Throw away needles in a needle/sharp disposal box. Do not reuse needles or other items. When the box is full, follow all local rules for getting rid of it. Talk with a doctor or pharmacist if you have any questions. If using for high blood sugar:   Attach new needle before each dose. Take off the needle after each shot. Do not store this device with the needle on it. Put the cap back on after you are done using your dose. If you are also using insulin, you may inject this drug and the insulin in the same area of the body but not right next to each other. Do not mix this drug in the same syringe with insulin. If using for weight loss:   Each container is for one use only. Use right after opening. Throw away any part of the opened container after the dose is given. What do I do if I miss a dose? Tablets:   Skip the missed dose and go back to your normal time. Do not take 2 doses at the same time or extra doses. Prefilled syringes or pen: Take a missed dose as soon as you think about it and go back to your normal time. If it is less than 48 hours until your next dose, skip the missed and go back to your normal time. Do not take 2 doses within 48 hours of each other. If you miss 2 doses, call your doctor. How do I store and/or throw out this drug? Tablets:   Store in the original container at room temperature. Store in a dry place. Do not store in a bathroom. Prefilled syringes or pen:   Store unopened pens in a refrigerator. Do not freeze. Do not use if it has been frozen. Ozempic prefilled pens:   After opening, store in the refrigerator or at room temperature. Throw away any part not used after 56 days. Protect from heat and light. Wegovy prefilled pens: You may store unopened containers at room temperature. If you store at room temperature, throw away any part not used after 28 days. Store in the original container to protect from light. All products:   Keep all drugs in a safe place. Keep all drugs out of the reach of children and pets. Throw away unused or  drugs. Do not flush down a toilet or pour down a drain unless you are told to do so. Check with your pharmacist if you have questions about the best way to throw out drugs. There may be drug take-back programs in your area. General drug facts   If your symptoms or health problems do not get better or if they become worse, call your doctor. Do not share your drugs with others and do not take anyone else's drugs. Some drugs may have another patient information leaflet. If you have any questions about this drug, please talk with your doctor, nurse, pharmacist, or other health care provider. If you think there has been an overdose, call your poison control center or get medical care right away. Be ready to tell or show what was taken, how much, and when it happened. Last Reviewed [id number]  Consumer Information Use and Disclaimer   This generalized information is a limited summary of diagnosis, treatment, and/or medication information. It is not meant to be comprehensive and should be used as a tool to help the user understand and/or assess potential diagnostic and treatment options. It does NOT include all information about conditions, treatments, medications, side effects, or risks that may apply to a specific patient.  It is not intended to be medical advice or a substitute for the medical advice, diagnosis, or treatment of a health care provider based on the health care provider's examination and assessment of a patient's specific and unique circumstances. Patients must speak with a health care provider for complete information about their health, medical questions, and treatment options, including any risks or benefits regarding use of medications. This information does not endorse any treatments or medications as safe, effective, or approved for treating a specific patient. UpToDate, Inc. and its affiliates disclaim any warranty or liability relating to this information or the use thereof. The use of this information is governed by the Terms of Use, available at EnterprisePages.uy. com/en/know/clinical-effectiveness-terms. © 2022 UpToDate, Inc. and its affiliates and/or [employer]. All rights reserved. Semaglutide: Patient drug information  Copyright 8624-7373 [facility] 240 rights reserved. Contributor Disclosures  (For additional information see \"Semaglutide: Drug information\")    You must carefully read the \"Consumer Information Use and Disclaimer\" below in order to understand and correctly use this information. Brand Names: [place]  Ozempic (0.25 or 0.5 MG/DOSE); Ozempic (1 MG/DOSE); Ozempic (2 MG/DOSE); Rybelsus; [id number]    Brand Names: [place] ([facility])  Ozempic (0.25 or 0.5 MG/DOSE); Ozempic (1 MG/DOSE); Rybelsus    Warning   This drug has been shown to cause thyroid cancer in some animals. It is not known if this happens in humans. If thyroid cancer happens, it may be deadly if not found and treated early. Call your doctor right away if you have a neck mass, trouble breathing, trouble swallowing, or have hoarseness that will not go away. Do not use this drug if you have a health problem called Multiple Endocrine Neoplasia syndrome type 2 (MEN 2), or if you or a family member have had thyroid cancer. What is this drug used for? Ozempic prefilled pens and Rybelsus tablets: It is used to lower blood sugar in patients with high blood sugar (diabetes). Ozempic prefilled pens: It is used to lower the chance of heart attack, stroke, and death in some people. Wegovy prefilled pens: It is used to help with weight loss in certain people. What do I need to tell my doctor BEFORE I take this drug? For all uses of this drug:   If you are allergic to this drug; any part of this drug; or any other drugs, foods, or substances. Tell your doctor about the allergy and what signs you had. If you have ever had pancreatitis. If you have or have ever had depression or thoughts of suicide. If you are using another drug that has the same drug in it. If you are using another drug like this one. If you are not sure, ask your doctor or pharmacist.   If using for high blood sugar: If you have type 1 diabetes. Do not use this drug to treat type 1 diabetes. This is not a list of all drugs or health problems that interact with this drug. Tell your doctor and pharmacist about all of your drugs (prescription or OTC, natural products, vitamins) and health problems. You must check to make sure that it is safe for you to take this drug with all of your drugs and health problems. Do not start, stop, or change the dose of any drug without checking with your doctor. What are some things I need to know or do while I take this drug? For all uses of this drug:   Tell all of your health care providers that you take this drug. This includes your doctors, nurses, pharmacists, and dentists. Follow the diet and workout plan that your doctor told you about. Have blood work checked as you have been told by the doctor. Talk with the doctor. Talk with your doctor before you drink alcohol. Kidney problems have happened. Sometimes, these may need to be treated in the hospital or with dialysis.    If you cannot drink liquids by mouth or if you have upset stomach, throwing up, or diarrhea that does not go away; you need to avoid getting dehydrated. Contact your doctor to find out what to do. Dehydration may lead to low blood pressure or to new or worse kidney problems. Do not share pen or cartridge devices with another person even if the needle has been changed. Sharing these devices may pass infections from one person to another. This includes infections you may not know you have. If you are planning on getting pregnant, talk with your doctor. You may need to stop taking this drug at least 2 months before getting pregnant. If using for high blood sugar:   Wear disease medical alert ID (identification). Check your blood sugar as you have been told by your doctor. Do not drive if your blood sugar has been low. There is a greater chance of you having a crash. It may be harder to control blood sugar during times of stress such as fever, infection, injury, or surgery. A change in physical activity, exercise, or diet may also affect blood sugar. Tell your doctor if you are pregnant, plan on getting pregnant, or are breast-feeding. You will need to talk about the benefits and risks to you and the baby. If using for weight loss: If you have high blood sugar (diabetes), you will need to watch your blood sugar closely. Weight loss during pregnancy may cause harm to the unborn baby. If you get pregnant while taking this drug or if you want to get pregnant, call your doctor right away. Tell your doctor if you are breast-feeding. You will need to talk about any risks to your baby. What are some side effects that I need to call my doctor about right away? WARNING/CAUTION: Even though it may be rare, some people may have very bad and sometimes deadly side effects when taking a drug.  Tell your doctor or get medical help right away if you have any of the following signs or symptoms that may be related to a very bad side effect:   For all uses of this drug:   Signs of an allergic reaction, like rash; hives; itching; red, swollen, blistered, or peeling skin with or without fever; wheezing; tightness in the chest or throat; trouble breathing, swallowing, or talking; unusual hoarseness; or swelling of the mouth, face, lips, tongue, or throat. Signs of kidney problems like unable to pass urine, change in how much urine is passed, blood in the urine, or a big weight gain. Signs of gallbladder problems like pain in the upper right belly area, right shoulder area, or between the shoulder blades; change in stools; dark urine or yellow skin or eyes; or fever with chills. Very bad dizziness or passing out. A fast heartbeat. Change in eyesight. Low blood sugar can happen. The chance may be raised when this drug is used with other drugs for diabetes. Signs may be dizziness, headache, feeling sleepy or weak, shaking, fast heartbeat, confusion, hunger, or sweating. Call your doctor right away if you have any of these signs. Follow what you have been told to do for low blood sugar. This may include taking glucose tablets, liquid glucose, or some fruit juices. Severe and sometimes deadly pancreas problems (pancreatitis) have happened with this drug. Call your doctor right away if you have severe stomach pain, severe back pain, or severe upset stomach or throwing up. If using for weight loss:   New or worse behavior or mood changes like depression or thoughts of suicide. What are some other side effects of this drug? All drugs may cause side effects. However, many people have no side effects or only have minor side effects. Call your doctor or get medical help if any of these side effects or any other side effects bother you or do not go away: If using for high blood sugar:   Constipation, diarrhea, stomach pain, upset stomach, or throwing up. Tablets:   Decreased appetite.    If using for weight loss:   Constipation, diarrhea, stomach pain, upset stomach, or throwing up. Headache. Feeling dizzy, tired, or weak. Burping. Gas. These are not all of the side effects that may occur. If you have questions about side effects, call your doctor. Call your doctor for medical advice about side effects. You may report side effects to your national health agency. How is this drug best taken? Use this drug as ordered by your doctor. Read all information given to you. Follow all instructions closely. Tablets: Take at least 30 minutes before the first food, drink, or drugs of the day. Take with plain water only. Do not take with more than 4 ounces (120 mL) of water. Swallow whole. Do not chew, break, or crush. Keep taking this drug as you have been told by your doctor or other health care provider, even if you feel well. Prefilled syringes or pen: It is given as a shot into the fatty part of the skin on the top of the thigh, belly area, or upper arm. If you will be giving yourself the shot, your doctor or nurse will teach you how to give the shot. Take with or without food. Take the same day each week. Move the site where you give the shot with each shot. Do not use if the solution is cloudy, leaking, or has particles. Do not use if solution changes color. Wash your hands before and after use. Keep taking this drug as you have been told by your doctor or other health care provider, even if you feel well. Throw away needles in a needle/sharp disposal box. Do not reuse needles or other items. When the box is full, follow all local rules for getting rid of it. Talk with a doctor or pharmacist if you have any questions. If using for high blood sugar:   Attach new needle before each dose. Take off the needle after each shot. Do not store this device with the needle on it. Put the cap back on after you are done using your dose.    If you are also using insulin, you may inject this drug and the insulin in the same area of the body but not right next to each other. Do not mix this drug in the same syringe with insulin. This product may make a clicking sound as you prepare the dose. Do not prepare the dose by counting the clicks. Doing so could lead to using the wrong dose. If using for weight loss:   Each container is for one use only. Use right after opening. Throw away any part of the opened container after the dose is given. What do I do if I miss a dose? Tablets:   Skip the missed dose and go back to your normal time. Do not take 2 doses at the same time or extra doses. Prefilled syringes or pen: Take a missed dose as soon as you think about it and go back to your normal time. If it is less than 48 hours until your next dose, skip the missed and go back to your normal time. Do not take 2 doses within 48 hours of each other. If you miss 2 doses, call your doctor. How do I store and/or throw out this drug? Tablets:   Store in the original container at room temperature. Store in a dry place. Do not store in a bathroom. Prefilled syringes or pen:   Store unopened pens in a refrigerator. Do not freeze. Do not use if it has been frozen. Ozempic prefilled pens:   After opening, store in the refrigerator or at room temperature. Throw away any part not used after 56 days. Protect from heat and light. Wegovy prefilled pens: You may store unopened containers at room temperature. If you store at room temperature, throw away any part not used after 28 days. Store in the original container to protect from light. All products:   Keep all drugs in a safe place. Keep all drugs out of the reach of children and pets. Throw away unused or  drugs. Do not flush down a toilet or pour down a drain unless you are told to do so. Check with your pharmacist if you have questions about the best way to throw out drugs. There may be drug take-back programs in your area.     General drug facts   If your symptoms or health problems do not get better or if they become worse, call your doctor. Do not share your drugs with others and do not take anyone else's drugs. Some drugs may have another patient information leaflet. If you have any questions about this drug, please talk with your doctor, nurse, pharmacist, or other health care provider. If you think there has been an overdose, call your poison control center or get medical care right away. Be ready to tell or show what was taken, how much, and when it happened. Last Reviewed Date2022-10-25  Consumer Information Use and Disclaimer   This generalized information is a limited summary of diagnosis, treatment, and/or medication information. It is not meant to be comprehensive and should be used as a tool to help the user understand and/or assess potential diagnostic and treatment options. It does NOT include all information about conditions, treatments, medications, side effects, or risks that may apply to a specific patient. It is not intended to be medical advice or a substitute for the medical advice, diagnosis, or treatment of a health care provider based on the health care provider's examination and assessment of a patient's specific and unique circumstances. Patients must speak with a health care provider for complete information about their health, medical questions, and treatment options, including any risks or benefits regarding use of medications. This information does not endorse any treatments or medications as safe, effective, or approved for treating a specific patient. Up[employer], Inc[employer] and its affiliates disclaim any warranty or liability relating to this information or the use thereof. The use of this information is governed by the Terms of Use, available at [employer]. com/en/know/clinical-effectiveness-terms. © 2022 [employer]Date, Inc. and its affiliates and/or [facility]. All rights reserved.

## 2023-04-22 RX ORDER — CYANOCOBALAMIN/FOLIC AC/VIT B6 1-2.2-25MG
TABLET ORAL
Qty: 90 TABLET | Refills: 3 | Status: SHIPPED | OUTPATIENT
Start: 2023-04-22

## 2023-04-23 DIAGNOSIS — E78.00 HYPERCHOLESTEROLEMIA: Primary | ICD-10-CM

## 2023-04-29 RX ORDER — SERTRALINE HYDROCHLORIDE 100 MG/1
100 TABLET, FILM COATED ORAL DAILY
Qty: 90 TABLET | Refills: 1 | Status: SHIPPED | OUTPATIENT
Start: 2023-04-29

## 2023-04-29 RX ORDER — ROSUVASTATIN CALCIUM 5 MG/1
5 TABLET, COATED ORAL DAILY
Qty: 90 TABLET | Refills: 1 | Status: SHIPPED | OUTPATIENT
Start: 2023-04-29

## 2023-05-09 RX ORDER — ACYCLOVIR 400 MG/1
TABLET ORAL
Qty: 25 TABLET | Refills: 0 | Status: SHIPPED | OUTPATIENT
Start: 2023-05-09

## 2023-05-09 NOTE — TELEPHONE ENCOUNTER
Last Refill: 12-5-22  Last Visit: 2-21-23  Next Visit: Visit date not found     Requested Prescriptions     Pending Prescriptions Disp Refills    acyclovir (ZOVIRAX) 400 MG tablet [Pharmacy Med Name: ACYCLOVIR 400MG TABLETS] 25 tablet 0     Sig: TAKE 1 TABLET BY MOUTH FOUR TIMES DAILY

## 2023-05-22 ENCOUNTER — NURSE ONLY (OUTPATIENT)
Facility: CLINIC | Age: 48
End: 2023-05-22

## 2023-05-22 DIAGNOSIS — E78.00 HYPERCHOLESTEROLEMIA: ICD-10-CM

## 2023-05-23 LAB
CHOLEST SERPL-MCNC: 216 MG/DL
HDLC SERPL-MCNC: 60 MG/DL
HDLC SERPL: 3.6 (ref 0–5)
LDLC SERPL CALC-MCNC: 132.4 MG/DL (ref 0–100)
TRIGL SERPL-MCNC: 118 MG/DL
VLDLC SERPL CALC-MCNC: 23.6 MG/DL

## 2023-05-23 NOTE — RESULT ENCOUNTER NOTE
I reviewed these results. Please notify the patient. Moderate hypercholesterolemia. LDL worsened compared to 1 year ago. Advise dietary and exercise regimen modifications. 10-year Cardiovascular Risk Shiela Beasley, 2013):   The 10-year ASCVD risk score (Luh MAE, et al., 2019) is: 0.9%    Values used to calculate the score:      Age: 49 years      Sex: Female      Is Non- : No      Diabetic: No      Tobacco smoker: No      Systolic Blood Pressure: 318 mmHg      Is BP treated: No      HDL Cholesterol: 60 MG/DL      Total Cholesterol: 216 MG/DL

## 2023-08-30 ENCOUNTER — OFFICE VISIT (OUTPATIENT)
Facility: CLINIC | Age: 48
End: 2023-08-30
Payer: COMMERCIAL

## 2023-08-30 VITALS
HEIGHT: 66 IN | DIASTOLIC BLOOD PRESSURE: 68 MMHG | WEIGHT: 200 LBS | OXYGEN SATURATION: 97 % | SYSTOLIC BLOOD PRESSURE: 119 MMHG | TEMPERATURE: 97.8 F | RESPIRATION RATE: 16 BRPM | BODY MASS INDEX: 32.14 KG/M2 | HEART RATE: 67 BPM

## 2023-08-30 DIAGNOSIS — D68.59 PROTEIN C DEFICIENCY (HCC): Primary | ICD-10-CM

## 2023-08-30 DIAGNOSIS — Z15.89 MTHFR MUTATION: ICD-10-CM

## 2023-08-30 DIAGNOSIS — F41.8 ANXIETY ASSOCIATED WITH DEPRESSION: ICD-10-CM

## 2023-08-30 DIAGNOSIS — E78.2 MIXED HYPERLIPIDEMIA: ICD-10-CM

## 2023-08-30 DIAGNOSIS — Z13.1 DIABETES MELLITUS SCREENING: ICD-10-CM

## 2023-08-30 PROBLEM — D68.52 PROTHROMBIN GENE MUTATION (HCC): Status: ACTIVE | Noted: 2023-08-30

## 2023-08-30 PROCEDURE — 99204 OFFICE O/P NEW MOD 45 MIN: CPT | Performed by: FAMILY MEDICINE

## 2023-08-30 RX ORDER — MULTIVIT WITH MINERALS/LUTEIN
TABLET ORAL
COMMUNITY

## 2023-08-30 RX ORDER — CALCIUM CARBONATE 500 MG/1
TABLET, CHEWABLE ORAL
COMMUNITY

## 2023-08-30 RX ORDER — CHLORAL HYDRATE 500 MG
CAPSULE ORAL
COMMUNITY

## 2023-08-30 ASSESSMENT — PATIENT HEALTH QUESTIONNAIRE - PHQ9
SUM OF ALL RESPONSES TO PHQ QUESTIONS 1-9: 0
1. LITTLE INTEREST OR PLEASURE IN DOING THINGS: 0
6. FEELING BAD ABOUT YOURSELF - OR THAT YOU ARE A FAILURE OR HAVE LET YOURSELF OR YOUR FAMILY DOWN: 0
7. TROUBLE CONCENTRATING ON THINGS, SUCH AS READING THE NEWSPAPER OR WATCHING TELEVISION: 0
10. IF YOU CHECKED OFF ANY PROBLEMS, HOW DIFFICULT HAVE THESE PROBLEMS MADE IT FOR YOU TO DO YOUR WORK, TAKE CARE OF THINGS AT HOME, OR GET ALONG WITH OTHER PEOPLE: 0
SUM OF ALL RESPONSES TO PHQ QUESTIONS 1-9: 0
SUM OF ALL RESPONSES TO PHQ QUESTIONS 1-9: 0
3. TROUBLE FALLING OR STAYING ASLEEP: 0
SUM OF ALL RESPONSES TO PHQ QUESTIONS 1-9: 0
9. THOUGHTS THAT YOU WOULD BE BETTER OFF DEAD, OR OF HURTING YOURSELF: 0
5. POOR APPETITE OR OVEREATING: 0
8. MOVING OR SPEAKING SO SLOWLY THAT OTHER PEOPLE COULD HAVE NOTICED. OR THE OPPOSITE, BEING SO FIGETY OR RESTLESS THAT YOU HAVE BEEN MOVING AROUND A LOT MORE THAN USUAL: 0
4. FEELING TIRED OR HAVING LITTLE ENERGY: 0

## 2023-08-30 NOTE — PROGRESS NOTES
Chief Complaint   Patient presents with    New Patient     Np to practice, has some concern for recent wt gain
nontender, nondistended. No organomegaly or masses appreciated on palpation. No CVA tenderness appreciated. Skin: Normal color and turgor. No concerning lesions appreciated throughout general exam.  MSK: Normal range of motion throughout all extremities without deformity, redness, swelling. Normal gait. Neuro: Cranial nerves II through XII grossly intact. No focal weaknesses, sensory loss, or discoordination appreciated. Mentating normally, alert and oriented x3. Psych: Calm, cooperative, pleasant. Good eye contact. Speech normal rate and francis. Thought process linear and goal-directed. Thought content normal no suicidal ideations or homicidal ideations. No reaction to internal auditory or visual stimuli. Judgment intact. No results found for any visits on 08/30/23.     HISTORICAL  Reviewed and updated today, and as noted below:    Past Medical History:   Diagnosis Date    Anxiety associated with depression 12/13/2017    Fibrocystic breast     History of abnormal cervical Pap smear 1993    HPV and colpo wnl fu    History of lipoma     forehead and shoulder removed in 2008    History of MTHFR mutation     Hypercholesterolemia     RX = rosuvastatin    Obesity     Protein C deficiency ([address] [facility]) 12/13/2017    RX = ASA 81 mg/d, Fish Oil    Prothrombin gene mutation ([address]) 2004    part of hypercoag workup; RX = ASA 81 mg/d, Fish Oil    Recurrent cold sores 12/13/2017     Past Surgical History:   Procedure Laterality Date    BREAST BIOPSY      COLPOSCOPY  1993    OTHER SURGICAL HISTORY      facial surgery    SHOULDER ARTHROSCOPY  2008    lump removed- lipoma    TONSILLECTOMY       Family History   Problem Relation Age of Onset    Stroke Paternal Grandmother     Breast Cancer Paternal Aunt     Breast Cancer Maternal Aunt     High Cholesterol Father     Hypertension Father      Social History     Socioeconomic History    Marital status:      Spouse name: None    Number of children: None    Years of

## 2023-08-30 NOTE — PATIENT INSTRUCTIONS
Goal fiber is 30g daily    [facility]. Consider getting accountability partner either  and/or sister. Find alternative snacking options. Watch out for alcohol and sugary beverages. [employer]. com

## 2024-02-26 ENCOUNTER — OFFICE VISIT (OUTPATIENT)
Age: 49
End: 2024-02-26
Payer: COMMERCIAL

## 2024-02-26 VITALS — WEIGHT: 204 LBS | BODY MASS INDEX: 33.43 KG/M2 | DIASTOLIC BLOOD PRESSURE: 68 MMHG | SYSTOLIC BLOOD PRESSURE: 111 MMHG

## 2024-02-26 DIAGNOSIS — Z12.4 CERVICAL CANCER SCREENING: ICD-10-CM

## 2024-02-26 DIAGNOSIS — Z11.51 ENCOUNTER FOR SCREENING FOR HUMAN PAPILLOMAVIRUS (HPV): ICD-10-CM

## 2024-02-26 DIAGNOSIS — Z01.419 ENCOUNTER FOR GYNECOLOGICAL EXAMINATION: Primary | ICD-10-CM

## 2024-02-26 PROCEDURE — 99396 PREV VISIT EST AGE 40-64: CPT | Performed by: OBSTETRICS & GYNECOLOGY

## 2024-02-26 NOTE — PROGRESS NOTES
Nayely Ambriz is a 48 y.o. female returns for an annual exam     Chief Complaint   Patient presents with    Annual Exam       Patient's last menstrual period was 01/30/2024.  Her periods are abnormal. Patient reports having a cycle that lasted from 1/30/2024 - 2/9/2024; she reports her cycle before that was normal.   Problems: problems - irregular cycles  Birth Control: vasectomy.  Last Pap: see report obtained 3 year(s) ago.  She does not have a history of ASAF 2, 3 or cervical cancer.   Last Mammogram: had her mammogram today in our office.  It was see report.     1. Have you been to the ER, urgent care clinic, or hospitalized since your last visit? No    2. Have you seen or consulted any other health care providers outside of the [facility] since your last visit? No    Examination chaperoned by Paulina Jordan LPN.  
tenderness present, no inflammatory lesions present, no masses present  Vagina: normal vaginal vault without central or paravaginal defects, thin white discharge present, no inflammatory lesions present, no masses present  Bladder: non-tender to palpation  Urethra: appears normal  Cervix: normal   Uterus: normal size, shape and consistency  Adnexa: no adnexal tenderness present, no adnexal masses present  Perineum: perineum within normal limits, no evidence of trauma, no rashes or skin lesions present  Anus: anus within normal limits, no hemorrhoids present  Inguinal Lymph Nodes: no lymphadenopathy present    Skin  General Inspection: no rash, no lesions identified    Neurologic/Psychiatric  Mental Status:  Orientation: grossly oriented to person, place and time  Mood and Affect: mood normal, affect appropriate    Assessment:  48 y.o. No obstetric history on file. for well woman exam  Her current medical status is satisfactory with no evidence of significant gynecologic issues.  Encounter Diagnoses   Name Primary?    Encounter for gynecological examination Yes    Cervical cancer screening     Encounter for screening for human papillomavirus (HPV)        Plan:  I recommended follow up one year for routine annual gynecologic exam or sooner prn  Patient should follow up with a primary care physician for chronic medical problems and evaluation of non-gynecologic concerns and to please contact our office with any GYN questions or concerns.  I recommend STD testing per CDC guidelines and at patient request.   Follow up: well woman exam one year  We discussed typical perimenopausal bleeding patterns and symptoms.  I recommend that she notify MD for chaotic or symptomatic bleeding, or bleeding in between menses or intermenstrual bleeding for additional evaluation.  She can also schedule a consult to discuss management of symptoms of perimenopause that are concerning her or interfering with her life or day to day function or

## 2024-03-01 RX ORDER — ROSUVASTATIN CALCIUM 5 MG/1
5 TABLET, COATED ORAL DAILY
Qty: 90 TABLET | Refills: 1 | Status: SHIPPED | OUTPATIENT
Start: 2024-03-01

## 2024-03-01 RX ORDER — SERTRALINE HYDROCHLORIDE 100 MG/1
100 TABLET, FILM COATED ORAL DAILY
Qty: 90 TABLET | Refills: 1 | Status: SHIPPED | OUTPATIENT
Start: 2024-03-01

## 2024-03-04 LAB
CYTOLOGIST CVX/VAG CYTO: NORMAL
CYTOLOGY CVX/VAG DOC CYTO: NORMAL
CYTOLOGY CVX/VAG DOC THIN PREP: NORMAL
DX ICD CODE: NORMAL
HPV GENOTYPE REFLEX: NORMAL
HPV I/H RISK 4 DNA CVX QL PROBE+SIG AMP: NEGATIVE
Lab: NORMAL
OTHER STN SPEC: NORMAL
STAT OF ADQ CVX/VAG CYTO-IMP: NORMAL

## 2024-08-30 DIAGNOSIS — Z13.1 DIABETES MELLITUS SCREENING: ICD-10-CM

## 2024-08-30 DIAGNOSIS — E78.2 MIXED HYPERLIPIDEMIA: ICD-10-CM

## 2024-08-30 LAB
CHOLEST SERPL-MCNC: 164 MG/DL
GLUCOSE P FAST SERPL-MCNC: 85 MG/DL (ref 65–100)
HDLC SERPL-MCNC: 59 MG/DL
HDLC SERPL: 2.8 (ref 0–5)
LDLC SERPL CALC-MCNC: 92 MG/DL (ref 0–100)
TRIGL SERPL-MCNC: 65 MG/DL
VLDLC SERPL CALC-MCNC: 13 MG/DL

## 2024-09-03 RX ORDER — SERTRALINE HYDROCHLORIDE 100 MG/1
100 TABLET, FILM COATED ORAL DAILY
Qty: 90 TABLET | Refills: 1 | Status: SHIPPED | OUTPATIENT
Start: 2024-09-03

## 2024-09-03 RX ORDER — ROSUVASTATIN CALCIUM 5 MG/1
5 TABLET, COATED ORAL DAILY
Qty: 90 TABLET | Refills: 1 | Status: SHIPPED | OUTPATIENT
Start: 2024-09-03

## 2024-09-17 DIAGNOSIS — B00.1 RECURRENT COLD SORES: ICD-10-CM

## 2024-09-17 RX ORDER — ACYCLOVIR 400 MG/1
400 TABLET ORAL 4 TIMES DAILY
Qty: 25 TABLET | Refills: 3 | Status: SHIPPED | OUTPATIENT
Start: 2024-09-17

## 2024-10-14 RX ORDER — SERTRALINE HYDROCHLORIDE 100 MG/1
100 TABLET, FILM COATED ORAL DAILY
Qty: 90 TABLET | Refills: 1 | OUTPATIENT
Start: 2024-10-14

## 2024-10-14 RX ORDER — SERTRALINE HYDROCHLORIDE 100 MG/1
100 TABLET, FILM COATED ORAL DAILY
Qty: 90 TABLET | Refills: 1 | Status: SHIPPED | OUTPATIENT
Start: 2024-10-14

## 2024-10-14 RX ORDER — ROSUVASTATIN CALCIUM 5 MG/1
5 TABLET, COATED ORAL DAILY
Qty: 90 TABLET | Refills: 1 | Status: SHIPPED | OUTPATIENT
Start: 2024-10-14

## 2024-10-14 RX ORDER — ROSUVASTATIN CALCIUM 5 MG/1
5 TABLET, COATED ORAL DAILY
Qty: 90 TABLET | Refills: 1 | OUTPATIENT
Start: 2024-10-14

## 2024-12-03 ENCOUNTER — OFFICE VISIT (OUTPATIENT)
Facility: CLINIC | Age: 49
End: 2024-12-03
Payer: COMMERCIAL

## 2024-12-03 VITALS
OXYGEN SATURATION: 97 % | WEIGHT: 199 LBS | DIASTOLIC BLOOD PRESSURE: 69 MMHG | HEIGHT: 66 IN | TEMPERATURE: 97.4 F | HEART RATE: 69 BPM | SYSTOLIC BLOOD PRESSURE: 99 MMHG | BODY MASS INDEX: 31.98 KG/M2 | RESPIRATION RATE: 20 BRPM

## 2024-12-03 DIAGNOSIS — Z00.01 ENCOUNTER FOR WELL ADULT EXAM WITH ABNORMAL FINDINGS: Primary | ICD-10-CM

## 2024-12-03 DIAGNOSIS — F41.8 ANXIETY ASSOCIATED WITH DEPRESSION: ICD-10-CM

## 2024-12-03 DIAGNOSIS — D17.9 LIPOMA, UNSPECIFIED SITE: ICD-10-CM

## 2024-12-03 DIAGNOSIS — Z15.89 MTHFR MUTATION: ICD-10-CM

## 2024-12-03 DIAGNOSIS — D68.59 PROTEIN C DEFICIENCY (HCC): ICD-10-CM

## 2024-12-03 DIAGNOSIS — Z13.1 DIABETES MELLITUS SCREENING: ICD-10-CM

## 2024-12-03 DIAGNOSIS — E78.2 MIXED HYPERLIPIDEMIA: ICD-10-CM

## 2024-12-03 DIAGNOSIS — D68.52 PROTHROMBIN GENE MUTATION (HCC): ICD-10-CM

## 2024-12-03 PROBLEM — E78.5 HYPERLIPIDEMIA: Status: ACTIVE | Noted: 2024-12-03

## 2024-12-03 PROCEDURE — 99396 PREV VISIT EST AGE 40-64: CPT | Performed by: FAMILY MEDICINE

## 2024-12-03 SDOH — ECONOMIC STABILITY: INCOME INSECURITY: HOW HARD IS IT FOR YOU TO PAY FOR THE VERY BASICS LIKE FOOD, HOUSING, MEDICAL CARE, AND HEATING?: NOT HARD AT ALL

## 2024-12-03 SDOH — ECONOMIC STABILITY: FOOD INSECURITY: WITHIN THE PAST 12 MONTHS, YOU WORRIED THAT YOUR FOOD WOULD RUN OUT BEFORE YOU GOT MONEY TO BUY MORE.: NEVER TRUE

## 2024-12-03 SDOH — ECONOMIC STABILITY: FOOD INSECURITY: WITHIN THE PAST 12 MONTHS, THE FOOD YOU BOUGHT JUST DIDN'T LAST AND YOU DIDN'T HAVE MONEY TO GET MORE.: NEVER TRUE

## 2024-12-03 ASSESSMENT — PATIENT HEALTH QUESTIONNAIRE - PHQ9
SUM OF ALL RESPONSES TO PHQ QUESTIONS 1-9: 0
8. MOVING OR SPEAKING SO SLOWLY THAT OTHER PEOPLE COULD HAVE NOTICED. OR THE OPPOSITE, BEING SO FIGETY OR RESTLESS THAT YOU HAVE BEEN MOVING AROUND A LOT MORE THAN USUAL: NOT AT ALL
9. THOUGHTS THAT YOU WOULD BE BETTER OFF DEAD, OR OF HURTING YOURSELF: NOT AT ALL
SUM OF ALL RESPONSES TO PHQ QUESTIONS 1-9: 0
5. POOR APPETITE OR OVEREATING: NOT AT ALL
6. FEELING BAD ABOUT YOURSELF - OR THAT YOU ARE A FAILURE OR HAVE LET YOURSELF OR YOUR FAMILY DOWN: NOT AT ALL
SUM OF ALL RESPONSES TO PHQ QUESTIONS 1-9: 0
SUM OF ALL RESPONSES TO PHQ QUESTIONS 1-9: 0
2. FEELING DOWN, DEPRESSED OR HOPELESS: NOT AT ALL
SUM OF ALL RESPONSES TO PHQ9 QUESTIONS 1 & 2: 0
7. TROUBLE CONCENTRATING ON THINGS, SUCH AS READING THE NEWSPAPER OR WATCHING TELEVISION: NOT AT ALL
10. IF YOU CHECKED OFF ANY PROBLEMS, HOW DIFFICULT HAVE THESE PROBLEMS MADE IT FOR YOU TO DO YOUR WORK, TAKE CARE OF THINGS AT HOME, OR GET ALONG WITH OTHER PEOPLE: NOT DIFFICULT AT ALL
3. TROUBLE FALLING OR STAYING ASLEEP: NOT AT ALL
4. FEELING TIRED OR HAVING LITTLE ENERGY: NOT AT ALL
1. LITTLE INTEREST OR PLEASURE IN DOING THINGS: NOT AT ALL

## 2024-12-03 NOTE — PROGRESS NOTES
Chief Complaint   Patient presents with    Annual Exam     Left upper shoulder/\"mass\" painful when pressure applied, noticed 1 year ago, unsure if change in size.   HX: of masses non cancer      \"Have you been to the ER, urgent care clinic since your last visit?  Hospitalized since your last visit?\"    NO    “Have you seen or consulted any other health care providers outside of [facility] since your last visit?”    NO        “Have you had a colorectal cancer screening such as a colonoscopy/FIT/Cologuard?    NO- Scheduled for 12/16/2024    No colonoscopy on file  No cologuard on file  No FIT/FOBT on file   No flexible sigmoidoscopy on file         Click Here for Release of Records Request  
nontender, nondistended.  No organomegaly or masses appreciated on palpation.  No CVA tenderness appreciated.  Skin: Lipoma middle of forehead small 1cm, also another lipoma L shoulder large 4-5cm, overlying epidermal cyst. Otherwise Normal color and turgor.  No concerning lesions appreciated throughout general exam.  MSK: Normal range of motion throughout all extremities without deformity, redness, swelling.  Normal gait.  Neuro: Cranial nerves II through XII grossly intact.  No focal weaknesses, sensory loss, or discoordination appreciated.  Mentating normally, alert and oriented x3.  Psych: Calm, cooperative, pleasant.  Good eye contact.  Speech normal rate and francis.  Thought process linear and goal-directed.  Thought content normal no suicidal ideations or homicidal ideations.  No reaction to internal auditory or visual stimuli.  Judgment intact.            Personalized Preventive Plan  Current Health Maintenance Status  Immunization History   Administered Date(s) Administered    COVID-19, MODERNA BLUE border, Primary or Immunocompromised, (age 12y+), IM, 100 mcg/0.5mL 03/30/2021, 04/27/2021, 11/27/2021, 10/27/2022    Influenza Virus Vaccine 10/27/2022    TDaP, ADACEL (age 10y-64y), BOOSTRIX (age 10y+), IM, 0.5mL 05/03/2019        Health Maintenance Due   Topic Date Due    HIV screen  Never done    Hepatitis B vaccine (1 of 3 - 19+ 3-dose series) Never done    Colorectal Cancer Screen  Never done    Flu vaccine (1) 08/01/2024    Depression Monitoring  08/30/2024    COVID-19 Vaccine (5 - 2023-24 season) 09/01/2024    Shingles vaccine (1 of 2) Never done      Recommendations for Preventive Services Due: see orders and patient instructions/AVS.    The patient (or guardian, if applicable) and other individuals in attendance with the patient were advised that Artificial Intelligence will be utilized during this visit to record and process the conversation to generate a clinical note. The patient (or guardian, if

## 2024-12-16 LAB — COLONOSCOPY, EXTERNAL: NORMAL

## 2024-12-23 ENCOUNTER — HOSPITAL ENCOUNTER (OUTPATIENT)
Facility: HOSPITAL | Age: 49
Discharge: HOME | End: 2024-12-26
Attending: FAMILY MEDICINE

## 2024-12-23 DIAGNOSIS — E78.2 MIXED HYPERLIPIDEMIA: ICD-10-CM

## 2024-12-23 PROCEDURE — 75571 CT HRT W/O DYE W/CA TEST: CPT

## 2025-01-20 RX ORDER — ROSUVASTATIN CALCIUM 5 MG/1
5 TABLET, COATED ORAL DAILY
Qty: 90 TABLET | Refills: 3 | Status: SHIPPED | OUTPATIENT
Start: 2025-01-20

## 2025-01-20 NOTE — TELEPHONE ENCOUNTER
Rx should have another refill, however, pharmacy could not located it in their system. They asked for a new order to be sent.     Thank you,   Emre

## 2025-03-05 ENCOUNTER — OFFICE VISIT (OUTPATIENT)
Age: 50
End: 2025-03-05
Payer: COMMERCIAL

## 2025-03-05 VITALS
SYSTOLIC BLOOD PRESSURE: 106 MMHG | DIASTOLIC BLOOD PRESSURE: 67 MMHG | HEART RATE: 87 BPM | HEIGHT: 66 IN | BODY MASS INDEX: 32.21 KG/M2 | WEIGHT: 200.4 LBS

## 2025-03-05 DIAGNOSIS — Z01.419 ENCOUNTER FOR GYNECOLOGICAL EXAMINATION: Primary | ICD-10-CM

## 2025-03-05 PROCEDURE — 99459 PELVIC EXAMINATION: CPT | Performed by: OBSTETRICS & GYNECOLOGY

## 2025-03-05 PROCEDURE — 99396 PREV VISIT EST AGE 40-64: CPT | Performed by: OBSTETRICS & GYNECOLOGY

## 2025-03-05 NOTE — PROGRESS NOTES
[facility]  [phone]    Zulay Elizalde MD, FACOG        Annual Gynecologic Exam:  Chief Complaint   Patient presents with    Annual Exam     Mammogram       Nayely Ambriz is a ,  49 y.o. female   No LMP recorded.    The patient presents for her annual gynecologic checkup.     The patient is having problems - occ skips cycle 1/year, otherwise still regular.    History of Present Illness  The patient presents for a well-woman exam.    She reports regular menstrual cycles, with the exception of one missed cycle in recent years. She has not detected any abnormalities such as lumps or bumps in her breast tissue. She has been making efforts to incorporate exercise into her routine, despite frequent travel.        Per Rooming Note:    No LMP recorded.  Her periods are moderate in flow and usually regular with a 26-32 day interval with 3-7 day duration.  She has dysmenorrhea.  Problems: pt reports she skips her cycle every year in November.   Birth Control: vasectomy.  Last Pap: normal obtained 2024  She does not have a history of ASAF 2, 3 or cervical cancer.   Last Mammogram: had her mammogram today in our office.   Last Bone Density: never done.   Last colonoscopy: 2024    Sexual history and Contraception:    Social History     Substance and Sexual Activity   Sexual Activity Yes    Partners: Male    Birth control/protection: Male Sterilization    Comment: Vasectomy      Past Medical History:   Diagnosis Date    Anxiety associated with depression 2017    Fibrocystic breast     H/O mammogram 2024    low risk/Heterogeneously dense    History of abnormal cervical Pap smear     HPV and colpo wnl fu    History of lipoma     forehead and shoulder removed in     History of MTHFR mutation     Hypercholesterolemia     RX = rosuvastatin    Obesity     Pap smear for cervical cancer screening 2024    WNL/HPV neg    Protein C deficiency 2017    RX = ASA 81 mg/d,

## 2025-03-05 NOTE — PROGRESS NOTES
Nayely Ambriz is a 49 y.o. female returns for an annual exam     Chief Complaint   Patient presents with    Annual Exam     Mammogram       No LMP recorded.  Her periods are moderate in flow and usually regular with a 26-32 day interval with 3-7 day duration.  She has dysmenorrhea.  Problems: pt reports she skips her cycle every year in November.   Birth Control: vasectomy.  Last Pap: normal obtained 2/2024  She does not have a history of ASAF 2, 3 or cervical cancer.   Last Mammogram: had her mammogram today in our office.   Last Bone Density: never done.   Last colonoscopy: 12/2024      Examination chaperoned by Ivana Aldana MA.

## 2025-03-06 ENCOUNTER — PATIENT MESSAGE (OUTPATIENT)
Facility: CLINIC | Age: 50
End: 2025-03-06

## 2025-03-06 DIAGNOSIS — E78.2 MIXED HYPERLIPIDEMIA: Primary | ICD-10-CM

## 2025-03-06 DIAGNOSIS — E66.811 CLASS 1 OBESITY WITH SERIOUS COMORBIDITY AND BODY MASS INDEX (BMI) OF 32.0 TO 32.9 IN ADULT, UNSPECIFIED OBESITY TYPE: ICD-10-CM

## 2025-04-02 ENCOUNTER — RESULTS FOLLOW-UP (OUTPATIENT)
Age: 50
End: 2025-04-02

## 2025-04-18 RX ORDER — SERTRALINE HYDROCHLORIDE 100 MG/1
100 TABLET, FILM COATED ORAL DAILY
Qty: 90 TABLET | Refills: 1 | Status: SHIPPED | OUTPATIENT
Start: 2025-04-18

## 2025-04-28 RX ORDER — CYANOCOBALAMIN/FOLIC AC/VIT B6 2-2.5-25MG
1 TABLET ORAL DAILY
Qty: 90 TABLET | Refills: 3 | Status: SHIPPED | OUTPATIENT
Start: 2025-04-28

## 2025-05-08 RX ORDER — ROSUVASTATIN CALCIUM 5 MG/1
5 TABLET, COATED ORAL DAILY
Qty: 90 TABLET | Refills: 1 | Status: SHIPPED | OUTPATIENT
Start: 2025-05-08